# Patient Record
Sex: FEMALE | Race: WHITE | URBAN - NONMETROPOLITAN AREA
[De-identification: names, ages, dates, MRNs, and addresses within clinical notes are randomized per-mention and may not be internally consistent; named-entity substitution may affect disease eponyms.]

---

## 2017-01-26 ENCOUNTER — APPOINTMENT (RX ONLY)
Dept: URBAN - NONMETROPOLITAN AREA CLINIC 1 | Facility: CLINIC | Age: 57
Setting detail: DERMATOLOGY
End: 2017-01-26

## 2017-01-26 VITALS — HEIGHT: 64 IN | WEIGHT: 175 LBS

## 2017-01-26 DIAGNOSIS — D22 MELANOCYTIC NEVI: ICD-10-CM

## 2017-01-26 DIAGNOSIS — L40.0 PSORIASIS VULGARIS: ICD-10-CM | Status: WELL CONTROLLED

## 2017-01-26 PROBLEM — D22.4 MELANOCYTIC NEVI OF SCALP AND NECK: Status: ACTIVE | Noted: 2017-01-26

## 2017-01-26 PROCEDURE — 99214 OFFICE O/P EST MOD 30 MIN: CPT

## 2017-01-26 PROCEDURE — ? OBSERVATION

## 2017-01-26 PROCEDURE — ? COUNSELING

## 2017-01-26 ASSESSMENT — LOCATION DETAILED DESCRIPTION DERM
LOCATION DETAILED: RIGHT KNEE
LOCATION DETAILED: LEFT KNEE
LOCATION DETAILED: RIGHT SUPERIOR ANTERIOR NECK
LOCATION DETAILED: RIGHT ELBOW
LOCATION DETAILED: LEFT ELBOW

## 2017-01-26 ASSESSMENT — LOCATION SIMPLE DESCRIPTION DERM
LOCATION SIMPLE: RIGHT ELBOW
LOCATION SIMPLE: LEFT KNEE
LOCATION SIMPLE: LEFT ELBOW
LOCATION SIMPLE: RIGHT ANTERIOR NECK
LOCATION SIMPLE: RIGHT KNEE

## 2017-01-26 ASSESSMENT — LOCATION ZONE DERM
LOCATION ZONE: NECK
LOCATION ZONE: ARM
LOCATION ZONE: LEG

## 2017-01-26 NOTE — HPI: RASH (PSORIASIS)
How Severe Is Your Psoriasis?: mild
Is This A New Presentation, Or A Follow-Up?: Follow Up Psoriasis
Additional History: Psoriasis has improved on its own.  Needs minimal topical steroids.  No longer taking Otezla.

## 2017-01-27 ENCOUNTER — APPOINTMENT (RX ONLY)
Dept: URBAN - NONMETROPOLITAN AREA CLINIC 1 | Facility: CLINIC | Age: 57
Setting detail: DERMATOLOGY
End: 2017-01-27

## 2017-01-27 DIAGNOSIS — Z41.9 ENCOUNTER FOR PROCEDURE FOR PURPOSES OTHER THAN REMEDYING HEALTH STATE, UNSPECIFIED: ICD-10-CM

## 2017-01-27 PROCEDURE — ? BOTOX

## 2017-01-27 NOTE — PROCEDURE: BOTOX
Price (Use Numbers Only, No Special Characters Or $): 569
Periorbital Skin Units: 0
Additional Area 1 Location: Crows feet
Consent: Written consent obtained. Risks include but not limited to lid/brow ptosis, bruising, swelling, diplopia, temporary effect, incomplete chemical denervation.
Dilution (U/0.1 Cc): 3
Additional Area 5 Location: Upper lip
Expiration Date (Month Year): 8/2019
Glabellar Complex Units: 15
Additional Area 4 Location: Gummy smile
Additional Area 3 Location: Infraorbital
Lot #: [id number]
Detail Level: Zone
Additional Area 6 Location: arch of eyebrows
Additional Area 2 Location: Bunny lines
Post-Care Instructions: Patient instructed to not lie down for 4 hours and limit physical activity for 24 hours.

## 2018-02-14 ENCOUNTER — RX ONLY (OUTPATIENT)
Age: 58
Setting detail: RX ONLY
End: 2018-02-14

## 2018-02-14 RX ORDER — VALACYCLOVIR 1 G/1
TABLET ORAL
Qty: 30 | Refills: 0 | Status: CANCELLED
Stop reason: CLARIF

## 2018-06-14 ENCOUNTER — APPOINTMENT (RX ONLY)
Dept: URBAN - NONMETROPOLITAN AREA CLINIC 1 | Facility: CLINIC | Age: 58
Setting detail: DERMATOLOGY
End: 2018-06-14

## 2018-06-14 DIAGNOSIS — L82.1 OTHER SEBORRHEIC KERATOSIS: ICD-10-CM

## 2018-06-14 DIAGNOSIS — D22 MELANOCYTIC NEVI: ICD-10-CM

## 2018-06-14 DIAGNOSIS — L40.0 PSORIASIS VULGARIS: ICD-10-CM

## 2018-06-14 PROBLEM — D22.4 MELANOCYTIC NEVI OF SCALP AND NECK: Status: ACTIVE | Noted: 2018-06-14

## 2018-06-14 PROCEDURE — ? TREATMENT REGIMEN

## 2018-06-14 PROCEDURE — ? COUNSELING

## 2018-06-14 PROCEDURE — ? OBSERVATION

## 2018-06-14 PROCEDURE — 99214 OFFICE O/P EST MOD 30 MIN: CPT

## 2018-06-14 ASSESSMENT — LOCATION ZONE DERM
LOCATION ZONE: NECK
LOCATION ZONE: ARM
LOCATION ZONE: LEG

## 2018-06-14 ASSESSMENT — LOCATION DETAILED DESCRIPTION DERM
LOCATION DETAILED: RIGHT KNEE
LOCATION DETAILED: LEFT ELBOW
LOCATION DETAILED: LEFT KNEE
LOCATION DETAILED: RIGHT ELBOW
LOCATION DETAILED: RIGHT SUPERIOR ANTERIOR NECK
LOCATION DETAILED: RIGHT DISTAL PRETIBIAL REGION

## 2018-06-14 ASSESSMENT — LOCATION SIMPLE DESCRIPTION DERM
LOCATION SIMPLE: RIGHT PRETIBIAL REGION
LOCATION SIMPLE: RIGHT ANTERIOR NECK
LOCATION SIMPLE: LEFT ELBOW
LOCATION SIMPLE: RIGHT ELBOW
LOCATION SIMPLE: RIGHT KNEE
LOCATION SIMPLE: LEFT KNEE

## 2018-06-14 NOTE — PROCEDURE: OBSERVATION
Body Location Override (Optional - Billing Will Still Be Based On Selected Body Map Location If Applicable): Right Anterior Neck
Size Of Lesion In Cm (Optional): 0.6
Morphology Per Location (Optional): dark brown macule
Detail Level: Detailed
X Size Of Lesion In Cm (Optional): 0.3

## 2018-06-14 NOTE — HPI: RASH (PSORIASIS)
How Severe Is Your Psoriasis?: mild
Is This A New Presentation, Or A Follow-Up?: Follow Up Psoriasis
Additional History: Currently taking Otezla that she had left over from last year and topical steroids.

## 2018-10-26 ENCOUNTER — RX ONLY (OUTPATIENT)
Age: 58
Setting detail: RX ONLY
End: 2018-10-26

## 2018-10-26 RX ORDER — VALACYCLOVIR 1 G/1
TABLET ORAL
Qty: 30 | Refills: 3 | Status: ERX | COMMUNITY
Start: 2018-10-26

## 2018-11-09 ENCOUNTER — APPOINTMENT (RX ONLY)
Dept: URBAN - NONMETROPOLITAN AREA CLINIC 1 | Facility: CLINIC | Age: 58
Setting detail: DERMATOLOGY
End: 2018-11-09

## 2018-11-09 DIAGNOSIS — Z41.9 ENCOUNTER FOR PROCEDURE FOR PURPOSES OTHER THAN REMEDYING HEALTH STATE, UNSPECIFIED: ICD-10-CM

## 2018-11-09 PROCEDURE — ? BOTOX

## 2019-07-26 ENCOUNTER — APPOINTMENT (RX ONLY)
Dept: URBAN - NONMETROPOLITAN AREA CLINIC 1 | Facility: CLINIC | Age: 59
Setting detail: DERMATOLOGY
End: 2019-07-26

## 2019-08-06 ENCOUNTER — RX ONLY (OUTPATIENT)
Age: 59
Setting detail: RX ONLY
End: 2019-08-06

## 2019-08-06 ENCOUNTER — APPOINTMENT (RX ONLY)
Dept: URBAN - NONMETROPOLITAN AREA CLINIC 1 | Facility: CLINIC | Age: 59
Setting detail: DERMATOLOGY
End: 2019-08-06

## 2019-08-06 DIAGNOSIS — Z12.83 ENCOUNTER FOR SCREENING FOR MALIGNANT NEOPLASM OF SKIN: ICD-10-CM

## 2019-08-06 DIAGNOSIS — L40.0 PSORIASIS VULGARIS: ICD-10-CM

## 2019-08-06 PROCEDURE — 99213 OFFICE O/P EST LOW 20 MIN: CPT | Mod: 25

## 2019-08-06 PROCEDURE — ? INTRALESIONAL KENALOG

## 2019-08-06 PROCEDURE — ? PRESCRIPTION

## 2019-08-06 PROCEDURE — 11900 INJECT SKIN LESIONS </W 7: CPT

## 2019-08-06 PROCEDURE — ? COUNSELING

## 2019-08-06 RX ORDER — BETAMETHASONE DIPROPIONATE 0.5 MG/G
LOTION TOPICAL
Qty: 1 | Refills: 1 | Status: ERX

## 2019-08-06 RX ORDER — BETAMETHASONE DIPROPIONATE 0.5 MG/G
OINTMENT TOPICAL
Qty: 1 | Refills: 1 | Status: ERX

## 2019-08-06 RX ORDER — HALOBETASOL PROPIONATE AND TAZAROTENE .1; .45 MG/G; MG/G
LOTION TOPICAL
Qty: 1 | Refills: 3 | Status: ERX | COMMUNITY
Start: 2019-08-06

## 2019-08-06 RX ORDER — BETAMETHASONE DIPROPIONATE 0.5 MG/G
CREAM TOPICAL
Qty: 1 | Refills: 3 | Status: ERX

## 2019-08-06 RX ADMIN — HALOBETASOL PROPIONATE AND TAZAROTENE 1: .1; .45 LOTION TOPICAL at 00:00

## 2019-08-06 ASSESSMENT — LOCATION DETAILED DESCRIPTION DERM
LOCATION DETAILED: RIGHT SUPERIOR MEDIAL UPPER BACK
LOCATION DETAILED: RIGHT DISTAL PRETIBIAL REGION
LOCATION DETAILED: LEFT ELBOW
LOCATION DETAILED: RIGHT ELBOW
LOCATION DETAILED: LEFT PROXIMAL PRETIBIAL REGION

## 2019-08-06 ASSESSMENT — LOCATION SIMPLE DESCRIPTION DERM
LOCATION SIMPLE: LEFT ELBOW
LOCATION SIMPLE: RIGHT PRETIBIAL REGION
LOCATION SIMPLE: RIGHT ELBOW
LOCATION SIMPLE: LEFT PRETIBIAL REGION
LOCATION SIMPLE: RIGHT UPPER BACK

## 2019-08-06 ASSESSMENT — LOCATION ZONE DERM
LOCATION ZONE: TRUNK
LOCATION ZONE: LEG
LOCATION ZONE: ARM

## 2019-08-06 NOTE — PROCEDURE: INTRALESIONAL KENALOG
Kenalog Preparation: Kenalog
Expiration Date For Kenalog (Optional): 09/2020
Medical Necessity Clause: This procedure was medically necessary because the lesions that were treated were:
Consent: The risks of atrophy were reviewed with the patient.
X Size Of Lesion In Cm (Optional): 0
Detail Level: Detailed
Administered By (Optional): LINDSAY
Concentration Of Kenalog Solution Injected (Mg/Ml): 5.0
Include Z78.9 (Other Specified Conditions Influencing Health Status) As An Associated Diagnosis?: No
Total Volume (Ccs): 1
Lot # For Kenalog (Optional): [id number]

## 2019-08-28 ENCOUNTER — APPOINTMENT (RX ONLY)
Dept: URBAN - NONMETROPOLITAN AREA CLINIC 1 | Facility: CLINIC | Age: 59
Setting detail: DERMATOLOGY
End: 2019-08-28

## 2019-08-28 DIAGNOSIS — Z41.9 ENCOUNTER FOR PROCEDURE FOR PURPOSES OTHER THAN REMEDYING HEALTH STATE, UNSPECIFIED: ICD-10-CM

## 2019-08-28 PROCEDURE — ? BOTOX

## 2019-08-28 PROCEDURE — ? ADDITIONAL NOTES

## 2019-08-28 NOTE — PROCEDURE: ADDITIONAL NOTES
Detail Level: Simple
Additional Notes: I counseled the patient regarding the following:\\n\\nRN discussed the risks and benefits of botox including but not limited to bruising, muscle weakness, lid or brow ptosis, double vision, facial weakness, headaches, procedural pain, temporary benefit only.\\n\\nIt is impossible to know the exact location of the vasculature in the treatment area. If a vein or artery is compromised by the injection, pt may experience an increase in bruising as a result. This is temporary and usually resolves in a few days to a few weeks. \\n\\nPt understands botox is a neuro-modulator that is injected into the muscle, which relaxes the muscle movement. \\n\\nPatient understands that treatment with botox only lessens dynamic wrinkles on a temporary basis, usually for 2-3 months. \\n\\nPt is aware of the variability in patient response, including a lack of response to treatment, and that no guarantee of length of benefit can be made. \\n\\nPt taking blood thinners prior to treatment may experience an increased risk for bruising and bleeding at the injection site. \\n\\nPatient should not lie down for 4 hours after injections nor exercise for 24 hours. \\n\\nIf bruising presents, pt may utilize oral or topical arnica and ice for treatment. \\n\\nPt instructed to contact the clinic with any questions, concerns, or post treatment complications; ie: pt does not feel the product worked for them, etc.\\n\\nPatient understands that the treatment is cosmetic in nature and not covered by insurance.\\n\\Henok pt concerns and questions addressed and pt verbalized understanding.

## 2019-08-28 NOTE — PROCEDURE: BOTOX
Additional Area 4 Units: 0
Lot #: [id number]
Glabellar Complex Units: 20
Post-Care Instructions: Patient instructed to not lie down for 4 hours and limit physical activity for 24 hours. RN recommends topical arnica and/or ice if bruising presents.\\n\\nPt instructed to contact the clinic with any questions, concerns, or post treatment complications, ie: the pt feels the product did not work for them, etc.  \\n\\Henok pt concerns and questions addressed and pt verbalized understanding.
Price (Use Numbers Only, No Special Characters Or $): 240
Detail Level: Detailed
Dilution (U/0.1 Cc): 4
Consent: Written consent reviewed by RN and signed by pt. Risks include but not limited to lid/brow ptosis, bruising, swelling, diplopia, temporary effect, incomplete chemical denervation.  \\nPt denies pregnancy. \\nIf pt is breastfeeding, they are counseled to dispose of breast milk for 24 hours post botox injection. \\nPt denies current administration of anti-biotics. \\nPt denies allergy to albumin or lactose. \\nPt's taking blood thinners are counseled on the increased risk of bleeding and bruising at the injection site.

## 2020-07-14 ENCOUNTER — APPOINTMENT (RX ONLY)
Dept: URBAN - NONMETROPOLITAN AREA CLINIC 1 | Facility: CLINIC | Age: 60
Setting detail: DERMATOLOGY
End: 2020-07-14

## 2020-07-14 DIAGNOSIS — L82.0 INFLAMED SEBORRHEIC KERATOSIS: ICD-10-CM

## 2020-07-14 DIAGNOSIS — Z12.83 ENCOUNTER FOR SCREENING FOR MALIGNANT NEOPLASM OF SKIN: ICD-10-CM

## 2020-07-14 DIAGNOSIS — L40.0 PSORIASIS VULGARIS: ICD-10-CM

## 2020-07-14 PROCEDURE — ? LIQUID NITROGEN

## 2020-07-14 PROCEDURE — 99214 OFFICE O/P EST MOD 30 MIN: CPT | Mod: 25

## 2020-07-14 PROCEDURE — ? COUNSELING

## 2020-07-14 PROCEDURE — ? PRESCRIPTION MEDICATION MANAGEMENT

## 2020-07-14 PROCEDURE — 17110 DESTRUCTION B9 LES UP TO 14: CPT

## 2020-07-14 PROCEDURE — ? PRESCRIPTION

## 2020-07-14 ASSESSMENT — LOCATION DETAILED DESCRIPTION DERM
LOCATION DETAILED: LEFT DISTAL POSTERIOR THIGH
LOCATION DETAILED: SUPERIOR LUMBAR SPINE
LOCATION DETAILED: RIGHT SUPERIOR MEDIAL UPPER BACK

## 2020-07-14 ASSESSMENT — LOCATION ZONE DERM
LOCATION ZONE: LEG
LOCATION ZONE: TRUNK

## 2020-07-14 ASSESSMENT — BSA PSORIASIS: % BODY COVERED IN PSORIASIS: 40

## 2020-07-14 ASSESSMENT — LOCATION SIMPLE DESCRIPTION DERM
LOCATION SIMPLE: LOWER BACK
LOCATION SIMPLE: LEFT POSTERIOR THIGH
LOCATION SIMPLE: RIGHT UPPER BACK

## 2020-07-14 NOTE — PROCEDURE: LIQUID NITROGEN
Detail Level: Simple
Add 52 Modifier (Optional): no
Consent: The patient's consent was obtained including but not limited to risks of crusting, scabbing, blistering, scarring, darker or lighter pigmentary change, recurrence, incomplete removal and infection.
Post-Care Instructions: I reviewed with the patient in detail post-care instructions. Patient is to wear sunprotection, and avoid picking at any of the treated lesions. Pt may apply Vaseline to crusted or scabbing areas.
Medical Necessity Clause: This procedure was medically necessary because the lesions that were treated were:
Number Of Freeze-Thaw Cycles: 2 freeze-thaw cycles
Medical Necessity Information: It is in your best interest to select a reason for this procedure from the list below. All of these items fulfill various CMS LCD requirements except the new and changing color options.
Render Post-Care Instructions In Note?: yes

## 2020-07-14 NOTE — PROCEDURE: PRESCRIPTION MEDICATION MANAGEMENT
Render In Strict Bullet Format?: No
Initiate Treatment: Manuela
Detail Level: Zone
Continue Regimen: topical steroids

## 2020-07-15 RX ORDER — APREMILAST 30 MG/1
1 TABLET, FILM COATED ORAL BID
Qty: 60 | Refills: 11 | Status: ERX

## 2020-12-11 ENCOUNTER — APPOINTMENT (RX ONLY)
Dept: URBAN - NONMETROPOLITAN AREA CLINIC 1 | Facility: CLINIC | Age: 60
Setting detail: DERMATOLOGY
End: 2020-12-11

## 2020-12-11 VITALS — TEMPERATURE: 97 F

## 2020-12-11 DIAGNOSIS — Z41.9 ENCOUNTER FOR PROCEDURE FOR PURPOSES OTHER THAN REMEDYING HEALTH STATE, UNSPECIFIED: ICD-10-CM

## 2020-12-11 PROCEDURE — ? BOTOX

## 2020-12-11 PROCEDURE — ? ADDITIONAL NOTES

## 2020-12-11 NOTE — PROCEDURE: BOTOX
Additional Area 6 Units: 0
Post-Care Instructions: Patient instructed to not lie down for 4 hours and limit physical activity for 24 hours. RN recommends topical arnica and/or ice if bruising presents.\\n\\nPt instructed to contact the clinic with any questions, concerns, or post treatment complications, ie: the pt feels the product did not work for them, etc.  \\n\\Henok pt concerns and questions addressed and pt verbalized understanding.
Consent: Written consent reviewed by RN and signed by pt. Risks include but not limited to lid/brow ptosis, bruising, swelling, diplopia, temporary effect, incomplete chemical denervation.  \\nPt denies pregnancy. \\nIf pt is breastfeeding, they are counseled to dispose of breast milk for 24 hours post botox injection. \\nPt denies current administration of anti-biotics. \\nPt denies allergy to albumin or lactose. \\nPt's taking blood thinners are counseled on the increased risk of bleeding and bruising at the injection site.
Lot #: [id number]
Price (Use Numbers Only, No Special Characters Or $): 522
Depressor Anguli Oris Units: 8
Detail Level: Detailed
Dilution (U/0.1 Cc): 4
Glabellar Complex Units: 20

## 2020-12-11 NOTE — PROCEDURE: ADDITIONAL NOTES
Additional Notes: I counseled the patient regarding the following:\\n\\nRN discussed the risks and benefits of botox including but not limited to bruising, muscle weakness, lid or brow ptosis, double vision, facial weakness, headaches, procedural pain, temporary benefit only.\\n\\nIt is impossible to know the exact location of the vasculature in the treatment area. If a vein or artery is compromised by the injection, pt may experience an increase in bruising as a result. This is temporary and usually resolves in a few days to a few weeks. \\n\\nPt understands botox is a neuro-modulator that is injected into the muscle, which relaxes the muscle movement. \\n\\nPatient understands that treatment with botox only lessens dynamic wrinkles on a temporary basis, usually for 2-3 months. \\n\\nPt is aware of the variability in patient response, including a lack of response to treatment, and that no guarantee of length of benefit can be made. \\n\\nPt taking blood thinners prior to treatment may experience an increased risk for bruising and bleeding at the injection site. \\n\\nPatient should not lie down for 4 hours after injections nor exercise for 24 hours. \\n\\nIf bruising presents, pt may utilize oral or topical arnica and ice for treatment. \\n\\nPt instructed to contact the clinic with any questions, concerns, or post treatment complications; ie: pt does not feel the product worked for them, etc.\\n\\nPatient understands that the treatment is cosmetic in nature and not covered by insurance.\\n\\Henok pt concerns and questions addressed and pt verbalized understanding.
Detail Level: Simple

## 2021-10-14 ENCOUNTER — APPOINTMENT (RX ONLY)
Dept: URBAN - NONMETROPOLITAN AREA CLINIC 1 | Facility: CLINIC | Age: 61
Setting detail: DERMATOLOGY
End: 2021-10-14

## 2021-10-14 DIAGNOSIS — Z41.9 ENCOUNTER FOR PROCEDURE FOR PURPOSES OTHER THAN REMEDYING HEALTH STATE, UNSPECIFIED: ICD-10-CM

## 2021-10-14 PROCEDURE — ? IRIDEX LASER

## 2021-10-14 PROCEDURE — ? BOTOX

## 2021-10-14 PROCEDURE — ? ADDITIONAL NOTES

## 2021-10-14 ASSESSMENT — LOCATION ZONE DERM: LOCATION ZONE: FACE

## 2021-10-14 ASSESSMENT — LOCATION DETAILED DESCRIPTION DERM: LOCATION DETAILED: INFERIOR MID FOREHEAD

## 2021-10-14 ASSESSMENT — LOCATION SIMPLE DESCRIPTION DERM: LOCATION SIMPLE: INFERIOR FOREHEAD

## 2021-10-14 NOTE — PROCEDURE: IRIDEX LASER
Pre Procedure Text: Pt swished with antiseptic mouthwash for 30 seconds prior to treatment. \\n\\nPhotos taken. The treatment areas were numbed for at least 15 mins, topical numbing removed with a warm towel.\\n\\nAppropriate eye protection placed on pt and pt instructed to keep their eyes closed during the treatment. \\n\\n532 wavelength \\nE=15\\nPD=28\\nRR=3
Price (Use Numbers Only, No Special Characters Or $): 100
Post Procedure Text: Alastin nectar and sunscreen applied. Post care reviewed and pt verbalized understanding.\\n\\nPt is aware that several treatments may be required to achieve the desired result.
Angeles Override (Optional): 532 wavelength
Smyth: 3 smyth
Handpiece: 1000 micron
External Cooling Fan Speed: 5
Pulse Count (Optional): 9
Post-Care Instructions: RN reviewed with the patient in detail post-care instructions. Patient is to apply vaseline with a q-tip to all crusted areas, and avoid picking at any scabs. Pt should stay away from the sun and wear sun protection until fully healed. Pt verbalized understanding.
Repetition Rate: 3 ms
Joules: 15
Consent: Written consent reviewed by RN and signed by pt.  Risks reviewed including but not limited to crusting, scabbing, blistering, scarring, darker or lighter pigmentary change, and/or incomplete removal.
Treatment Number: 1
Detail Level: Zone

## 2021-10-14 NOTE — PROCEDURE: BOTOX
Additional Area 6 Units: 0
Post-Care Instructions: Patient instructed to not lie down for 4 hours and limit physical activity for 24 hours. RN recommends topical arnica and/or ice if bruising presents.\\n\\nPt instructed to contact the clinic with any questions, concerns, or post treatment complications, ie: the pt feels the product did not work for them, etc.  \\n\\Henok pt concerns and questions addressed and pt verbalized understanding.
Consent: Written consent reviewed by RN and signed by pt. Risks include but not limited to lid/brow ptosis, bruising, swelling, diplopia, temporary effect, incomplete chemical denervation.  \\nPt denies pregnancy. \\nIf pt is breastfeeding, they are counseled to dispose of breast milk for 24 hours post botox injection. \\nPt denies current administration of anti-biotics. \\nPt denies allergy to albumin or lactose. \\nPt's taking blood thinners are counseled on the increased risk of bleeding and bruising at the injection site.
Lot #: [id number]
Price (Use Numbers Only, No Special Characters Or $): 882
Depressor Anguli Oris Units: 8
Detail Level: Detailed
Dilution (U/0.1 Cc): 4
Glabellar Complex Units: 20

## 2021-10-21 ENCOUNTER — HOSPITAL ENCOUNTER (INPATIENT)
Facility: MEDICAL CENTER | Age: 61
LOS: 2 days | DRG: 247 | End: 2021-10-23
Attending: EMERGENCY MEDICINE | Admitting: STUDENT IN AN ORGANIZED HEALTH CARE EDUCATION/TRAINING PROGRAM
Payer: COMMERCIAL

## 2021-10-21 DIAGNOSIS — I21.4 NSTEMI (NON-ST ELEVATED MYOCARDIAL INFARCTION) (HCC): ICD-10-CM

## 2021-10-21 DIAGNOSIS — R07.9 CHEST PAIN, UNSPECIFIED TYPE: ICD-10-CM

## 2021-10-21 LAB
ALBUMIN SERPL BCP-MCNC: 4.2 G/DL (ref 3.2–4.9)
ALBUMIN/GLOB SERPL: 1.5 G/DL
ALP SERPL-CCNC: 57 U/L (ref 30–99)
ALT SERPL-CCNC: 28 U/L (ref 2–50)
ANION GAP SERPL CALC-SCNC: 11 MMOL/L (ref 7–16)
APTT PPP: 73.9 SEC (ref 24.7–36)
AST SERPL-CCNC: 69 U/L (ref 12–45)
BASOPHILS # BLD AUTO: 0.3 % (ref 0–1.8)
BASOPHILS # BLD: 0.02 K/UL (ref 0–0.12)
BILIRUB SERPL-MCNC: 0.8 MG/DL (ref 0.1–1.5)
BUN SERPL-MCNC: 10 MG/DL (ref 8–22)
CALCIUM SERPL-MCNC: 9.3 MG/DL (ref 8.5–10.5)
CHLORIDE SERPL-SCNC: 107 MMOL/L (ref 96–112)
CHOLEST SERPL-MCNC: 235 MG/DL (ref 100–199)
CO2 SERPL-SCNC: 21 MMOL/L (ref 20–33)
CREAT SERPL-MCNC: 0.7 MG/DL (ref 0.5–1.4)
EOSINOPHIL # BLD AUTO: 0.13 K/UL (ref 0–0.51)
EOSINOPHIL NFR BLD: 1.8 % (ref 0–6.9)
ERYTHROCYTE [DISTWIDTH] IN BLOOD BY AUTOMATED COUNT: 41 FL (ref 35.9–50)
GLOBULIN SER CALC-MCNC: 2.8 G/DL (ref 1.9–3.5)
GLUCOSE SERPL-MCNC: 107 MG/DL (ref 65–99)
HCT VFR BLD AUTO: 41.8 % (ref 37–47)
HDLC SERPL-MCNC: 45 MG/DL
HGB BLD-MCNC: 14.3 G/DL (ref 12–16)
IMM GRANULOCYTES # BLD AUTO: 0.02 K/UL (ref 0–0.11)
IMM GRANULOCYTES NFR BLD AUTO: 0.3 % (ref 0–0.9)
INR PPP: 1.04 (ref 0.87–1.13)
LDLC SERPL CALC-MCNC: 155 MG/DL
LYMPHOCYTES # BLD AUTO: 1.98 K/UL (ref 1–4.8)
LYMPHOCYTES NFR BLD: 27.4 % (ref 22–41)
MAGNESIUM SERPL-MCNC: 2.3 MG/DL (ref 1.5–2.5)
MCH RBC QN AUTO: 30.8 PG (ref 27–33)
MCHC RBC AUTO-ENTMCNC: 34.2 G/DL (ref 33.6–35)
MCV RBC AUTO: 89.9 FL (ref 81.4–97.8)
MONOCYTES # BLD AUTO: 0.53 K/UL (ref 0–0.85)
MONOCYTES NFR BLD AUTO: 7.3 % (ref 0–13.4)
NEUTROPHILS # BLD AUTO: 4.55 K/UL (ref 2–7.15)
NEUTROPHILS NFR BLD: 62.9 % (ref 44–72)
NRBC # BLD AUTO: 0 K/UL
NRBC BLD-RTO: 0 /100 WBC
PLATELET # BLD AUTO: 214 K/UL (ref 164–446)
PMV BLD AUTO: 10.6 FL (ref 9–12.9)
POTASSIUM SERPL-SCNC: 3.9 MMOL/L (ref 3.6–5.5)
PROT SERPL-MCNC: 7 G/DL (ref 6–8.2)
PROTHROMBIN TIME: 13.3 SEC (ref 12–14.6)
RBC # BLD AUTO: 4.65 M/UL (ref 4.2–5.4)
SODIUM SERPL-SCNC: 139 MMOL/L (ref 135–145)
TRIGL SERPL-MCNC: 177 MG/DL (ref 0–149)
TSH SERPL DL<=0.005 MIU/L-ACNC: 1.28 UIU/ML (ref 0.38–5.33)
UFH PPP CHRO-ACNC: 0.33 IU/ML
WBC # BLD AUTO: 7.2 K/UL (ref 4.8–10.8)

## 2021-10-21 PROCEDURE — 85730 THROMBOPLASTIN TIME PARTIAL: CPT

## 2021-10-21 PROCEDURE — 85025 COMPLETE CBC W/AUTO DIFF WBC: CPT

## 2021-10-21 PROCEDURE — 84443 ASSAY THYROID STIM HORMONE: CPT

## 2021-10-21 PROCEDURE — 85610 PROTHROMBIN TIME: CPT

## 2021-10-21 PROCEDURE — 99285 EMERGENCY DEPT VISIT HI MDM: CPT

## 2021-10-21 PROCEDURE — 770020 HCHG ROOM/CARE - TELE (206)

## 2021-10-21 PROCEDURE — A9270 NON-COVERED ITEM OR SERVICE: HCPCS | Performed by: INTERNAL MEDICINE

## 2021-10-21 PROCEDURE — 85520 HEPARIN ASSAY: CPT

## 2021-10-21 PROCEDURE — 80307 DRUG TEST PRSMV CHEM ANLYZR: CPT

## 2021-10-21 PROCEDURE — 83735 ASSAY OF MAGNESIUM: CPT

## 2021-10-21 PROCEDURE — 99223 1ST HOSP IP/OBS HIGH 75: CPT | Performed by: STUDENT IN AN ORGANIZED HEALTH CARE EDUCATION/TRAINING PROGRAM

## 2021-10-21 PROCEDURE — 96366 THER/PROPH/DIAG IV INF ADDON: CPT

## 2021-10-21 PROCEDURE — 93005 ELECTROCARDIOGRAM TRACING: CPT | Performed by: EMERGENCY MEDICINE

## 2021-10-21 PROCEDURE — 700111 HCHG RX REV CODE 636 W/ 250 OVERRIDE (IP): Performed by: EMERGENCY MEDICINE

## 2021-10-21 PROCEDURE — 99222 1ST HOSP IP/OBS MODERATE 55: CPT | Performed by: INTERNAL MEDICINE

## 2021-10-21 PROCEDURE — 80053 COMPREHEN METABOLIC PANEL: CPT

## 2021-10-21 PROCEDURE — 700105 HCHG RX REV CODE 258: Performed by: INTERNAL MEDICINE

## 2021-10-21 PROCEDURE — 96365 THER/PROPH/DIAG IV INF INIT: CPT

## 2021-10-21 PROCEDURE — 700102 HCHG RX REV CODE 250 W/ 637 OVERRIDE(OP): Performed by: INTERNAL MEDICINE

## 2021-10-21 PROCEDURE — 80061 LIPID PANEL: CPT

## 2021-10-21 RX ORDER — BISACODYL 10 MG
10 SUPPOSITORY, RECTAL RECTAL
Status: DISCONTINUED | OUTPATIENT
Start: 2021-10-21 | End: 2021-10-22

## 2021-10-21 RX ORDER — MORPHINE SULFATE 4 MG/ML
2 INJECTION, SOLUTION INTRAMUSCULAR; INTRAVENOUS
Status: DISCONTINUED | OUTPATIENT
Start: 2021-10-21 | End: 2021-10-23 | Stop reason: HOSPADM

## 2021-10-21 RX ORDER — ONDANSETRON 2 MG/ML
4 INJECTION INTRAMUSCULAR; INTRAVENOUS EVERY 4 HOURS PRN
Status: DISCONTINUED | OUTPATIENT
Start: 2021-10-21 | End: 2021-10-21

## 2021-10-21 RX ORDER — ASPIRIN 81 MG/1
81 TABLET, CHEWABLE ORAL DAILY
Status: DISCONTINUED | OUTPATIENT
Start: 2021-10-22 | End: 2021-10-21

## 2021-10-21 RX ORDER — PROMETHAZINE HYDROCHLORIDE 25 MG/1
12.5-25 TABLET ORAL EVERY 4 HOURS PRN
Status: DISCONTINUED | OUTPATIENT
Start: 2021-10-21 | End: 2021-10-21

## 2021-10-21 RX ORDER — ONDANSETRON 4 MG/1
4 TABLET, ORALLY DISINTEGRATING ORAL EVERY 4 HOURS PRN
Status: DISCONTINUED | OUTPATIENT
Start: 2021-10-21 | End: 2021-10-23 | Stop reason: HOSPADM

## 2021-10-21 RX ORDER — PROCHLORPERAZINE EDISYLATE 5 MG/ML
5-10 INJECTION INTRAMUSCULAR; INTRAVENOUS EVERY 4 HOURS PRN
Status: DISCONTINUED | OUTPATIENT
Start: 2021-10-21 | End: 2021-10-23 | Stop reason: HOSPADM

## 2021-10-21 RX ORDER — HEPARIN SODIUM 5000 [USP'U]/100ML
0-30 INJECTION, SOLUTION INTRAVENOUS CONTINUOUS
Status: DISCONTINUED | OUTPATIENT
Start: 2021-10-21 | End: 2021-10-22

## 2021-10-21 RX ORDER — HEPARIN SODIUM 1000 [USP'U]/ML
30 INJECTION, SOLUTION INTRAVENOUS; SUBCUTANEOUS PRN
Status: DISCONTINUED | OUTPATIENT
Start: 2021-10-21 | End: 2021-10-22

## 2021-10-21 RX ORDER — GUAIFENESIN/DEXTROMETHORPHAN 100-10MG/5
10 SYRUP ORAL EVERY 6 HOURS PRN
Status: DISCONTINUED | OUTPATIENT
Start: 2021-10-21 | End: 2021-10-23 | Stop reason: HOSPADM

## 2021-10-21 RX ORDER — CLONIDINE HYDROCHLORIDE 0.1 MG/1
0.1 TABLET ORAL EVERY 6 HOURS PRN
Status: DISCONTINUED | OUTPATIENT
Start: 2021-10-21 | End: 2021-10-23 | Stop reason: HOSPADM

## 2021-10-21 RX ORDER — ENALAPRILAT 1.25 MG/ML
1.25 INJECTION INTRAVENOUS EVERY 6 HOURS PRN
Status: DISCONTINUED | OUTPATIENT
Start: 2021-10-21 | End: 2021-10-23 | Stop reason: HOSPADM

## 2021-10-21 RX ORDER — LABETALOL HYDROCHLORIDE 5 MG/ML
10 INJECTION, SOLUTION INTRAVENOUS EVERY 4 HOURS PRN
Status: DISCONTINUED | OUTPATIENT
Start: 2021-10-21 | End: 2021-10-23 | Stop reason: HOSPADM

## 2021-10-21 RX ORDER — ALUMINA, MAGNESIA, AND SIMETHICONE 2400; 2400; 240 MG/30ML; MG/30ML; MG/30ML
30 SUSPENSION ORAL EVERY 4 HOURS PRN
Status: DISCONTINUED | OUTPATIENT
Start: 2021-10-21 | End: 2021-10-23 | Stop reason: HOSPADM

## 2021-10-21 RX ORDER — LORAZEPAM 1 MG/1
1 TABLET ORAL ONCE
Status: COMPLETED | OUTPATIENT
Start: 2021-10-21 | End: 2021-10-21

## 2021-10-21 RX ORDER — ACETAMINOPHEN 325 MG/1
650 TABLET ORAL EVERY 4 HOURS PRN
Status: DISCONTINUED | OUTPATIENT
Start: 2021-10-21 | End: 2021-10-23 | Stop reason: HOSPADM

## 2021-10-21 RX ORDER — POLYETHYLENE GLYCOL 3350 17 G/17G
1 POWDER, FOR SOLUTION ORAL
Status: DISCONTINUED | OUTPATIENT
Start: 2021-10-21 | End: 2021-10-22

## 2021-10-21 RX ORDER — SODIUM CHLORIDE 9 MG/ML
INJECTION, SOLUTION INTRAVENOUS CONTINUOUS
Status: DISCONTINUED | OUTPATIENT
Start: 2021-10-21 | End: 2021-10-23 | Stop reason: HOSPADM

## 2021-10-21 RX ORDER — ATORVASTATIN CALCIUM 20 MG/1
20 TABLET, FILM COATED ORAL
Status: DISCONTINUED | OUTPATIENT
Start: 2021-10-21 | End: 2021-10-21

## 2021-10-21 RX ORDER — HEPARIN SODIUM 10000 [USP'U]/100ML
12 INJECTION, SOLUTION INTRAVENOUS CONTINUOUS
Status: DISCONTINUED | OUTPATIENT
Start: 2021-10-21 | End: 2021-10-21

## 2021-10-21 RX ORDER — ACETAMINOPHEN 325 MG/1
650 TABLET ORAL EVERY 6 HOURS PRN
Status: DISCONTINUED | OUTPATIENT
Start: 2021-10-21 | End: 2021-10-21

## 2021-10-21 RX ORDER — LOSARTAN POTASSIUM 25 MG/1
25 TABLET ORAL DAILY
COMMUNITY
End: 2022-07-07

## 2021-10-21 RX ORDER — ATORVASTATIN CALCIUM 80 MG/1
80 TABLET, FILM COATED ORAL DAILY
Status: DISCONTINUED | OUTPATIENT
Start: 2021-10-22 | End: 2021-10-23 | Stop reason: HOSPADM

## 2021-10-21 RX ORDER — PROMETHAZINE HYDROCHLORIDE 25 MG/1
12.5-25 SUPPOSITORY RECTAL EVERY 4 HOURS PRN
Status: DISCONTINUED | OUTPATIENT
Start: 2021-10-21 | End: 2021-10-23 | Stop reason: HOSPADM

## 2021-10-21 RX ORDER — AMOXICILLIN 250 MG
2 CAPSULE ORAL 2 TIMES DAILY
Status: DISCONTINUED | OUTPATIENT
Start: 2021-10-21 | End: 2021-10-22

## 2021-10-21 RX ORDER — NITROGLYCERIN 0.4 MG/1
0.4 TABLET SUBLINGUAL
Status: DISCONTINUED | OUTPATIENT
Start: 2021-10-21 | End: 2021-10-23 | Stop reason: HOSPADM

## 2021-10-21 RX ADMIN — HEPARIN SODIUM 12 UNITS/KG/HR: 5000 INJECTION, SOLUTION INTRAVENOUS at 21:42

## 2021-10-21 RX ADMIN — LORAZEPAM 1 MG: 1 TABLET ORAL at 22:15

## 2021-10-21 RX ADMIN — METOPROLOL TARTRATE 25 MG: 25 TABLET, FILM COATED ORAL at 22:45

## 2021-10-21 RX ADMIN — SODIUM CHLORIDE: 9 INJECTION, SOLUTION INTRAVENOUS at 22:30

## 2021-10-21 ASSESSMENT — ENCOUNTER SYMPTOMS
SHORTNESS OF BREATH: 0
ABDOMINAL PAIN: 0
DIZZINESS: 0
PALPITATIONS: 0
NAUSEA: 0
HEADACHES: 0
CHEST TIGHTNESS: 0

## 2021-10-22 ENCOUNTER — APPOINTMENT (OUTPATIENT)
Dept: CARDIOLOGY | Facility: MEDICAL CENTER | Age: 61
DRG: 247 | End: 2021-10-22
Attending: INTERNAL MEDICINE
Payer: COMMERCIAL

## 2021-10-22 PROBLEM — E78.5 HLD (HYPERLIPIDEMIA): Status: ACTIVE | Noted: 2021-10-22

## 2021-10-22 PROBLEM — I21.4 NSTEMI (NON-ST ELEVATED MYOCARDIAL INFARCTION) (HCC): Status: ACTIVE | Noted: 2021-10-22

## 2021-10-22 PROBLEM — I10 HTN (HYPERTENSION): Status: ACTIVE | Noted: 2021-10-22

## 2021-10-22 LAB
ACT BLD: 208 SEC (ref 74–137)
AMPHET UR QL SCN: NEGATIVE
BARBITURATES UR QL SCN: NEGATIVE
BENZODIAZ UR QL SCN: NEGATIVE
BZE UR QL SCN: NEGATIVE
CANNABINOIDS UR QL SCN: NEGATIVE
EKG IMPRESSION: NORMAL
EKG IMPRESSION: NORMAL
EST. AVERAGE GLUCOSE BLD GHB EST-MCNC: 97 MG/DL
HBA1C MFR BLD: 5 % (ref 4–5.6)
METHADONE UR QL SCN: NEGATIVE
OPIATES UR QL SCN: NEGATIVE
OXYCODONE UR QL SCN: NEGATIVE
PCP UR QL SCN: NEGATIVE
PROPOXYPH UR QL SCN: NEGATIVE
TROPONIN T SERPL-MCNC: 691 NG/L (ref 6–19)
TROPONIN T SERPL-MCNC: 839 NG/L (ref 6–19)
UFH PPP CHRO-ACNC: 0.13 IU/ML
UFH PPP CHRO-ACNC: 0.25 IU/ML

## 2021-10-22 PROCEDURE — 700111 HCHG RX REV CODE 636 W/ 250 OVERRIDE (IP): Performed by: INTERNAL MEDICINE

## 2021-10-22 PROCEDURE — 700102 HCHG RX REV CODE 250 W/ 637 OVERRIDE(OP)

## 2021-10-22 PROCEDURE — 700111 HCHG RX REV CODE 636 W/ 250 OVERRIDE (IP)

## 2021-10-22 PROCEDURE — 700102 HCHG RX REV CODE 250 W/ 637 OVERRIDE(OP): Performed by: NURSE PRACTITIONER

## 2021-10-22 PROCEDURE — 770020 HCHG ROOM/CARE - TELE (206)

## 2021-10-22 PROCEDURE — 700102 HCHG RX REV CODE 250 W/ 637 OVERRIDE(OP): Performed by: STUDENT IN AN ORGANIZED HEALTH CARE EDUCATION/TRAINING PROGRAM

## 2021-10-22 PROCEDURE — 99233 SBSQ HOSP IP/OBS HIGH 50: CPT | Performed by: GENERAL PRACTICE

## 2021-10-22 PROCEDURE — 85520 HEPARIN ASSAY: CPT

## 2021-10-22 PROCEDURE — 92928 PRQ TCAT PLMT NTRAC ST 1 LES: CPT | Mod: RC | Performed by: INTERNAL MEDICINE

## 2021-10-22 PROCEDURE — 700102 HCHG RX REV CODE 250 W/ 637 OVERRIDE(OP): Performed by: INTERNAL MEDICINE

## 2021-10-22 PROCEDURE — 96366 THER/PROPH/DIAG IV INF ADDON: CPT

## 2021-10-22 PROCEDURE — A9270 NON-COVERED ITEM OR SERVICE: HCPCS | Performed by: STUDENT IN AN ORGANIZED HEALTH CARE EDUCATION/TRAINING PROGRAM

## 2021-10-22 PROCEDURE — 4A023N7 MEASUREMENT OF CARDIAC SAMPLING AND PRESSURE, LEFT HEART, PERCUTANEOUS APPROACH: ICD-10-PCS | Performed by: INTERNAL MEDICINE

## 2021-10-22 PROCEDURE — 83036 HEMOGLOBIN GLYCOSYLATED A1C: CPT

## 2021-10-22 PROCEDURE — 93005 ELECTROCARDIOGRAM TRACING: CPT | Performed by: INTERNAL MEDICINE

## 2021-10-22 PROCEDURE — 99153 MOD SED SAME PHYS/QHP EA: CPT

## 2021-10-22 PROCEDURE — A9270 NON-COVERED ITEM OR SERVICE: HCPCS

## 2021-10-22 PROCEDURE — 84484 ASSAY OF TROPONIN QUANT: CPT

## 2021-10-22 PROCEDURE — A9270 NON-COVERED ITEM OR SERVICE: HCPCS | Performed by: GENERAL PRACTICE

## 2021-10-22 PROCEDURE — 85347 COAGULATION TIME ACTIVATED: CPT | Mod: 91

## 2021-10-22 PROCEDURE — 99152 MOD SED SAME PHYS/QHP 5/>YRS: CPT | Performed by: INTERNAL MEDICINE

## 2021-10-22 PROCEDURE — 700105 HCHG RX REV CODE 258: Performed by: INTERNAL MEDICINE

## 2021-10-22 PROCEDURE — 700102 HCHG RX REV CODE 250 W/ 637 OVERRIDE(OP): Performed by: GENERAL PRACTICE

## 2021-10-22 PROCEDURE — A9270 NON-COVERED ITEM OR SERVICE: HCPCS | Performed by: INTERNAL MEDICINE

## 2021-10-22 PROCEDURE — 93458 L HRT ARTERY/VENTRICLE ANGIO: CPT | Mod: 26,59 | Performed by: INTERNAL MEDICINE

## 2021-10-22 PROCEDURE — 96375 TX/PRO/DX INJ NEW DRUG ADDON: CPT

## 2021-10-22 PROCEDURE — B2111ZZ FLUOROSCOPY OF MULTIPLE CORONARY ARTERIES USING LOW OSMOLAR CONTRAST: ICD-10-PCS | Performed by: INTERNAL MEDICINE

## 2021-10-22 PROCEDURE — 700101 HCHG RX REV CODE 250

## 2021-10-22 PROCEDURE — 027034Z DILATION OF CORONARY ARTERY, ONE ARTERY WITH DRUG-ELUTING INTRALUMINAL DEVICE, PERCUTANEOUS APPROACH: ICD-10-PCS | Performed by: INTERNAL MEDICINE

## 2021-10-22 PROCEDURE — 36415 COLL VENOUS BLD VENIPUNCTURE: CPT

## 2021-10-22 PROCEDURE — 700117 HCHG RX CONTRAST REV CODE 255: Performed by: INTERNAL MEDICINE

## 2021-10-22 PROCEDURE — 93010 ELECTROCARDIOGRAM REPORT: CPT | Performed by: INTERNAL MEDICINE

## 2021-10-22 PROCEDURE — 99232 SBSQ HOSP IP/OBS MODERATE 35: CPT | Mod: 25 | Performed by: INTERNAL MEDICINE

## 2021-10-22 PROCEDURE — B2151ZZ FLUOROSCOPY OF LEFT HEART USING LOW OSMOLAR CONTRAST: ICD-10-PCS | Performed by: INTERNAL MEDICINE

## 2021-10-22 PROCEDURE — A9270 NON-COVERED ITEM OR SERVICE: HCPCS | Performed by: NURSE PRACTITIONER

## 2021-10-22 RX ORDER — PRASUGREL 10 MG/1
TABLET, FILM COATED ORAL
Status: DISPENSED
Start: 2021-10-22 | End: 2021-10-22

## 2021-10-22 RX ORDER — HEPARIN SODIUM 200 [USP'U]/100ML
INJECTION, SOLUTION INTRAVENOUS
Status: COMPLETED
Start: 2021-10-22 | End: 2021-10-22

## 2021-10-22 RX ORDER — LIDOCAINE HYDROCHLORIDE 20 MG/ML
INJECTION, SOLUTION INFILTRATION; PERINEURAL
Status: COMPLETED
Start: 2021-10-22 | End: 2021-10-22

## 2021-10-22 RX ORDER — MIDAZOLAM HYDROCHLORIDE 1 MG/ML
INJECTION INTRAMUSCULAR; INTRAVENOUS
Status: COMPLETED
Start: 2021-10-22 | End: 2021-10-22

## 2021-10-22 RX ORDER — PRASUGREL 10 MG/1
TABLET, FILM COATED ORAL
Status: COMPLETED
Start: 2021-10-22 | End: 2021-10-22

## 2021-10-22 RX ORDER — PRASUGREL 10 MG/1
10 TABLET, FILM COATED ORAL DAILY
Status: DISCONTINUED | OUTPATIENT
Start: 2021-10-23 | End: 2021-10-23 | Stop reason: HOSPADM

## 2021-10-22 RX ORDER — SODIUM CHLORIDE 9 MG/ML
INJECTION, SOLUTION INTRAVENOUS CONTINUOUS
Status: ACTIVE | OUTPATIENT
Start: 2021-10-22 | End: 2021-10-22

## 2021-10-22 RX ORDER — HEPARIN SODIUM 1000 [USP'U]/ML
INJECTION, SOLUTION INTRAVENOUS; SUBCUTANEOUS
Status: COMPLETED
Start: 2021-10-22 | End: 2021-10-22

## 2021-10-22 RX ORDER — BISACODYL 10 MG
10 SUPPOSITORY, RECTAL RECTAL
Status: DISCONTINUED | OUTPATIENT
Start: 2021-10-22 | End: 2021-10-23 | Stop reason: HOSPADM

## 2021-10-22 RX ORDER — PRASUGREL 10 MG/1
60 TABLET, FILM COATED ORAL ONCE
Status: DISCONTINUED | OUTPATIENT
Start: 2021-10-22 | End: 2021-10-22

## 2021-10-22 RX ORDER — AMOXICILLIN 250 MG
2 CAPSULE ORAL 2 TIMES DAILY PRN
Status: DISCONTINUED | OUTPATIENT
Start: 2021-10-22 | End: 2021-10-23 | Stop reason: HOSPADM

## 2021-10-22 RX ORDER — POLYETHYLENE GLYCOL 3350 17 G/17G
1 POWDER, FOR SOLUTION ORAL
Status: DISCONTINUED | OUTPATIENT
Start: 2021-10-22 | End: 2021-10-23 | Stop reason: HOSPADM

## 2021-10-22 RX ORDER — VERAPAMIL HYDROCHLORIDE 2.5 MG/ML
INJECTION, SOLUTION INTRAVENOUS
Status: COMPLETED
Start: 2021-10-22 | End: 2021-10-22

## 2021-10-22 RX ORDER — METOPROLOL SUCCINATE 25 MG/1
25 TABLET, EXTENDED RELEASE ORAL
Status: DISCONTINUED | OUTPATIENT
Start: 2021-10-22 | End: 2021-10-23 | Stop reason: HOSPADM

## 2021-10-22 RX ADMIN — HEPARIN SODIUM 1900 UNITS: 1000 INJECTION, SOLUTION INTRAVENOUS; SUBCUTANEOUS at 08:28

## 2021-10-22 RX ADMIN — ACETAMINOPHEN 650 MG: 325 TABLET ORAL at 23:01

## 2021-10-22 RX ADMIN — MIDAZOLAM HYDROCHLORIDE 1 MG: 1 INJECTION, SOLUTION INTRAMUSCULAR; INTRAVENOUS at 12:03

## 2021-10-22 RX ADMIN — HEPARIN SODIUM 2000 UNITS: 1000 INJECTION, SOLUTION INTRAVENOUS; SUBCUTANEOUS at 12:07

## 2021-10-22 RX ADMIN — PRASUGREL 60 MG: 10 TABLET, FILM COATED ORAL at 12:02

## 2021-10-22 RX ADMIN — HEPARIN SODIUM 2000 UNITS: 1000 INJECTION, SOLUTION INTRAVENOUS; SUBCUTANEOUS at 11:23

## 2021-10-22 RX ADMIN — ATORVASTATIN CALCIUM 80 MG: 80 TABLET, FILM COATED ORAL at 05:35

## 2021-10-22 RX ADMIN — FENTANYL CITRATE 100 MCG: 50 INJECTION INTRAMUSCULAR; INTRAVENOUS at 11:24

## 2021-10-22 RX ADMIN — SODIUM CHLORIDE: 9 INJECTION, SOLUTION INTRAVENOUS at 13:00

## 2021-10-22 RX ADMIN — ASPIRIN 81 MG: 81 TABLET, COATED ORAL at 09:32

## 2021-10-22 RX ADMIN — MIDAZOLAM HYDROCHLORIDE 2 MG: 1 INJECTION, SOLUTION INTRAMUSCULAR; INTRAVENOUS at 11:31

## 2021-10-22 RX ADMIN — NITROGLYCERIN 10 ML: 20 INJECTION INTRAVENOUS at 11:23

## 2021-10-22 RX ADMIN — METOPROLOL SUCCINATE 25 MG: 25 TABLET, EXTENDED RELEASE ORAL at 14:03

## 2021-10-22 RX ADMIN — VERAPAMIL HYDROCHLORIDE 2.5 MG: 2.5 INJECTION, SOLUTION INTRAVENOUS at 11:23

## 2021-10-22 RX ADMIN — HEPARIN SODIUM: 1000 INJECTION, SOLUTION INTRAVENOUS; SUBCUTANEOUS at 11:24

## 2021-10-22 RX ADMIN — LIDOCAINE HYDROCHLORIDE: 20 INJECTION, SOLUTION INFILTRATION; PERINEURAL at 11:23

## 2021-10-22 RX ADMIN — IOHEXOL 63 ML: 350 INJECTION, SOLUTION INTRAVENOUS at 12:07

## 2021-10-22 ASSESSMENT — ENCOUNTER SYMPTOMS
TROUBLE SWALLOWING: 0
CHEST TIGHTNESS: 0
CONFUSION: 0
ABDOMINAL DISTENTION: 0
CHILLS: 0
ABDOMINAL PAIN: 0
PALPITATIONS: 0
FEVER: 0
BLOOD IN STOOL: 0
DIZZINESS: 0
SHORTNESS OF BREATH: 0
COLOR CHANGE: 0
NUMBNESS: 0
NERVOUS/ANXIOUS: 0
AGITATION: 0
DIAPHORESIS: 0
COUGH: 0

## 2021-10-22 ASSESSMENT — PAIN DESCRIPTION - PAIN TYPE
TYPE: ACUTE PAIN

## 2021-10-22 NOTE — ASSESSMENT & PLAN NOTE
Cardiology, Dr. Payan consulted in the ED and following.  Patient has received heparin bolus and remains on heparin drip on admission  Given ASA, plavix, nitro, metoprolol at outside hospital  Denies chest pain on exam    ECG showing: inferior ST elevation at outside hospital  Started on aspirin, beta-blocker, statin therapy    Patient is for cardiac catheterization on 10/22/2021

## 2021-10-22 NOTE — PROCEDURES
Cardiac Catheterization and Percutaneous Intervention Procedure Report    10/22/2021    Referring MD:     Indication for procedure: ACS <24 hours    Procedures:  · Insertion of 5/6 FR sheath in the right radial artery  · right and left coronary arteriograms  · Left heart catheterization and Left ventriculogram  · Angioplasty and placement of a 2.5 by 12mm Synergy drug-eluting stent in distal  right coronary artery.    Final diagnosis:   S/p successful PCI of distal RCA.  Residual moderate disease in left anterior descending artery.    Recommendations: If patient has angina, consider stress test as an outpatient to evaluate LAD disease.  Guideline directed medical therapy and risk factor management      Coronary arteriograms:  Left main: normal  Left anterior descending: Diffuse long segment moderate 60 to 70% stenosis in proximal to mid LAD stenosis. Apical LAD free of significant disease. Diagonal branches are small.   Left circumflex: Luminal irregularities without significant disease.  Gives first marginal/ramus in proximal portion with , posterolateral branches distally.    Right coronary: Codominant vessel, diffuse moderate disease (30 to 40%) in proximal to midportion .subtotally occluded in distal portion 99% stenosis, co-dominant    Left Heart Catheterization:  Left Ventriculogram: ejection fraction 55%  Left Ventricular EDP: 16 mm Hg   Aortic Valve Gradient: No significant AV gradient noted    Procedure details:  Ashley Gunter was brought to the cardiac catheterization lab where the right wrist was prepped and draped in the usual manner for cardiac catheterization.  The area was anesthetized with lidocaine and a 5/6 FR sheath was inserted into the right radial artery without difficulty. A #3.5 left Lev catheter was advanced to the ostia of the Left coronary artery and arteriograms were recorded.   A #4 right Lev catheter was advanced to the ostia of the right coronary artery and  "arteriograms were recorded. Aortic valve was crossed using #4 right Lev catheter left heart catheterization and left ventriculogram were performed.  Patient underwent percutaneous coronary revascularization as outlined below.  At the completion of the case the sheath was removed and hemostasis achieved utilizing a radial compression band .  Patient was pain-free and hemodynamically stable at the completion of the case.  There were no apparent complications.    Interventional Procedure:     Given the patient's clinical presentation and coronary anatomy, PCI was indicated and we proceeded with the intervention as detailed below.    Indication for PCI:  NSTE- ACD    Pre: 99%, 8 mm length, ERNESTINA 1 flow  Post: 0%, ERNESTINA 3 flow    Lesion complexity  Non-High  Severe calcification No  Bifurcation  No    Guide catheter: AR1 was advanced to the ostia of the right coronary artery.    Guide wire: A 0.014\" mm  Runthrough was advanced into the artery and crossed the lesion.    Balloon pre-dilatation: 2.0 by 12 mm Emerge inflated to 8 MARIBELL to pre-dilate the lesion.    Stent: A 2.5 by 12 mm Synergy drug-eluting  stent was deployed in distal  right coronary artery at 11 MARIBELL.    Anticoagulant: Heparin  Antiplatelet: Prasugrel  EBL <25 cc  Complications: none  Specimens: none  Contrast: 63cc  Fluorotime : see cath lab flowsheet      Sedation: I supervised moderate sedation over a trained independent observer.    Sedation start time: 11:17  End time: 11:58      Electronically signed by   Calvin Bello M.D., JULIO  Interventional cardiologist  10/22/2021  12:08 PM            "

## 2021-10-22 NOTE — ED TRIAGE NOTES
.  Chief Complaint   Patient presents with   • Chest Pain      Pt BIB EMS from [facility]. Pt went to local ED after sudden onset of left sided jaw pain radiating into her left neck at 0900, Pt denies SOB, nausea, dizziness.     Pt presents on Heparin drip at 12ui/kg. Pt denies pain at this time.

## 2021-10-22 NOTE — ED NOTES
Medicated pt per MAR. Pt asking when she will go to cath lab. Cath lab states ET of 11am. Notified pt of POC. Pt still denies CP

## 2021-10-22 NOTE — PROGRESS NOTES
"Cardiology Follow Up Progress Note    Date of Service  10/22/2021    Attending Physician  Brooke French D.O.    Chief Complaint   Jaw pain and left shoulder pain with substernal chest pressure     HPI  Ashley Gunter is a 60 y.o. female admitted 10/21/2021 with jaw pain with left shoulder pain leading to substernal chest pressure. Transferred from [facility] with ACS.     Family history significant for her sister dying at age 34 of a \"arrhythmia\".  Mother  age 80 of ovarian cancer.  Father  at age 83 of \"congestive heart failure\".    Interim Events  Patient denies any chest pain, sob, dizziness or palpitations. Heparin gtt     NSR on the monitor   bp stable   Trop trending up 839    Review of Systems  Review of Systems   Constitutional: Negative for chills, diaphoresis and fever.   HENT: Negative for nosebleeds and trouble swallowing.    Respiratory: Negative for cough, chest tightness and shortness of breath.    Cardiovascular: Negative for chest pain, palpitations and leg swelling.   Gastrointestinal: Negative for abdominal distention, abdominal pain and blood in stool.   Genitourinary: Negative for hematuria.   Skin: Negative for color change.   Neurological: Negative for dizziness, syncope and numbness.   Psychiatric/Behavioral: Negative for agitation and confusion. The patient is not nervous/anxious.        Vital signs in last 24 hours  Temp:  [37.3 °C (99.2 °F)] 37.3 °C (99.2 °F)  Pulse:  [52-74] 64  Resp:  [12-20] 16  BP: ()/() 95/59  SpO2:  [92 %-97 %] 95 %    Physical Exam  Physical Exam  Vitals and nursing note reviewed.   Constitutional:       Appearance: Normal appearance.   HENT:      Head: Normocephalic and atraumatic.   Eyes:      Pupils: Pupils are equal, round, and reactive to light.   Cardiovascular:      Rate and Rhythm: Normal rate and regular rhythm.      Heart sounds: Normal heart sounds. No murmur heard.     Pulmonary:      Effort: Pulmonary effort is normal.      Breath " sounds: Normal breath sounds.   Abdominal:      General: Abdomen is flat.   Musculoskeletal:      Cervical back: Normal range of motion.   Skin:     General: Skin is warm and dry.   Neurological:      General: No focal deficit present.      Mental Status: She is alert and oriented to person, place, and time.   Psychiatric:         Mood and Affect: Mood normal.         Behavior: Behavior normal.         Thought Content: Thought content normal.         Judgment: Judgment normal.         Lab Review  Lab Results   Component Value Date/Time    WBC 7.2 10/21/2021 09:43 PM    RBC 4.65 10/21/2021 09:43 PM    HEMOGLOBIN 14.3 10/21/2021 09:43 PM    HEMATOCRIT 41.8 10/21/2021 09:43 PM    MCV 89.9 10/21/2021 09:43 PM    MCH 30.8 10/21/2021 09:43 PM    MCHC 34.2 10/21/2021 09:43 PM    MPV 10.6 10/21/2021 09:43 PM      Lab Results   Component Value Date/Time    SODIUM 139 10/21/2021 09:43 PM    POTASSIUM 3.9 10/21/2021 09:43 PM    CHLORIDE 107 10/21/2021 09:43 PM    CO2 21 10/21/2021 09:43 PM    GLUCOSE 107 (H) 10/21/2021 09:43 PM    BUN 10 10/21/2021 09:43 PM    CREATININE 0.70 10/21/2021 09:43 PM      Lab Results   Component Value Date/Time    ASTSGOT 69 (H) 10/21/2021 09:43 PM    ALTSGPT 28 10/21/2021 09:43 PM     Lab Results   Component Value Date/Time    CHOLSTRLTOT 235 (H) 10/21/2021 09:43 PM     (H) 10/21/2021 09:43 PM    HDL 45 10/21/2021 09:43 PM    TRIGLYCERIDE 177 (H) 10/21/2021 09:43 PM    TROPONINT 839 (H) 10/22/2021 05:35 AM       No results for input(s): NTPROBNP in the last 72 hours.    Cardiac Imaging and Procedures Review  EKG:   Very slight ST elevation limited to lead II only T wave inversion in lead III   no   other signs of acute ischemia normal axis normal intervals   Electronically Signed On 10- 1:45:11 PDT by JEY SALAZAR MD    Echocardiogram:  Pending     Cardiac Catheterization:  Pending       Assessment/Plan  No new Assessment & Plan notes have been filed under this hospital service  since the last note was generated.  Service: Cardiology    1. NSTEMI:  - trop trending up 800s  - continue heparin gtt  - plan for coronary angiogram   - continue asa 81mg qd, atorvastatin 80mg qd, and changed metoprolol tartrate to succinate 25mg qd   - echo ordered and pending     Thank you for allowing me to participate in the care of this patient.  I will continue to follow this patient    Please contact me with any questions.    JULEE Wolf.

## 2021-10-22 NOTE — PROGRESS NOTES
"Hospital Medicine Daily Progress Note    Date of Service  10/22/2021    Chief Complaint  Ashley Gunter is a 60 y.o. female admitted 10/21/2021 with chest pain    Hospital Course  This is a 60 year old female with PMHx of hypertension, former tobacco use, and obesity who was admitted on 10/21/2021 after being transferred from an outside facility due to ACS/NSTEMI.    As per cardiology note, \"in the ER initial ECG showed subtle inferior ST elevation with reciprocal high lateral T wave abnormalities.  She was aggressively treated with aspirin, Plavix, nitroglycerin, metoprolol and heparin with complete resolution of her chest discomfort and normalization of her EKG.  Serial troponin levels reportedly were 0.1, 0.3, 2.0.\"    Cardiology recommends cardiac catheterization, patient is currently on heparin drip. ECHO ordered.    Interval Problem Update  Patient denies any current chest pain.    Cardiology recommends cardiac catheterization, patient is currently on heparin drip. ECHO ordered.    I have personally seen and examined the patient at bedside. I discussed the plan of care with patient, family and bedside RN.    Consultants/Specialty  cardiology    Code Status  Full Code    Disposition  Patient is not medically cleared.   Anticipate discharge to to home with close outpatient follow-up.  I have placed the appropriate orders for post-discharge needs.    Review of Systems  Review of Systems   All other systems reviewed and are negative.       Physical Exam  Temp:  [37.3 °C (99.2 °F)] 37.3 °C (99.2 °F)  Pulse:  [52-74] 64  Resp:  [12-20] 16  BP: ()/() 96/57  SpO2:  [92 %-98 %] 97 %    Physical Exam  Vitals and nursing note reviewed.   Constitutional:       General: She is not in acute distress.     Appearance: Normal appearance.   HENT:      Head: Normocephalic and atraumatic.      Mouth/Throat:      Mouth: Mucous membranes are moist.      Pharynx: No oropharyngeal exudate.   Eyes:      Extraocular " Movements: Extraocular movements intact.      Pupils: Pupils are equal, round, and reactive to light.   Cardiovascular:      Rate and Rhythm: Normal rate and regular rhythm.      Pulses: Normal pulses.      Heart sounds: No murmur heard.   No friction rub. No gallop.    Pulmonary:      Effort: Pulmonary effort is normal. No respiratory distress.      Breath sounds: No wheezing, rhonchi or rales.   Abdominal:      General: Bowel sounds are normal. There is no distension.      Palpations: Abdomen is soft. There is no mass.      Tenderness: There is no abdominal tenderness.   Musculoskeletal:         General: No swelling or tenderness. Normal range of motion.      Cervical back: Normal range of motion. No rigidity. No muscular tenderness.      Right lower leg: No edema.      Left lower leg: No edema.   Skin:     General: Skin is warm and dry.      Capillary Refill: Capillary refill takes less than 2 seconds.      Findings: No erythema or rash.   Neurological:      General: No focal deficit present.      Mental Status: She is alert and oriented to person, place, and time.      Motor: No weakness.      Gait: Gait normal.         Fluids  No intake or output data in the 24 hours ending 10/22/21 1049    Laboratory  Recent Labs     10/21/21  2143   WBC 7.2   RBC 4.65   HEMOGLOBIN 14.3   HEMATOCRIT 41.8   MCV 89.9   MCH 30.8   MCHC 34.2   RDW 41.0   PLATELETCT 214   MPV 10.6     Recent Labs     10/21/21  2143   SODIUM 139   POTASSIUM 3.9   CHLORIDE 107   CO2 21   GLUCOSE 107*   BUN 10   CREATININE 0.70   CALCIUM 9.3     Recent Labs     10/21/21  2143   APTT 73.9*   INR 1.04         Recent Labs     10/21/21  2143   TRIGLYCERIDE 177*   HDL 45   *       Imaging  CL-LEFT HEART CATHETERIZATION WITH POSSIBLE INTERVENTION    (Results Pending)   EC-ECHOCARDIOGRAM COMPLETE W/O CONT    (Results Pending)        Assessment/Plan  * NSTEMI (non-ST elevated myocardial infarction) (HCC)- (present on admission)  Assessment &  Plan  Cardiology, Dr. Payan consulted in the ED and following.  Patient has received heparin bolus and remains on heparin drip on admission  Given ASA, plavix, nitro, metoprolol at outside hospital  Denies chest pain on exam    ECG showing: inferior ST elevation at outside hospital  Started on aspirin, beta-blocker, statin therapy    Patient is for cardiac catheterization on 10/22/2021    HLD (hyperlipidemia)- (present on admission)  Assessment & Plan  High-dose statin ordered    HTN (hypertension)- (present on admission)  Assessment & Plan  Continue home meds  Admitted with telemetry       VTE prophylaxis: SCDs/TEDs and therapeutic anticoagulation with Heparin Drip    I have performed a physical exam and reviewed and updated ROS and Plan today (10/22/2021). In review of yesterday's note (10/21/2021), there are no changes except as documented above.

## 2021-10-22 NOTE — ED NOTES
Med Rec partially completed: per pt at bedside. Pt reports 10 mg of losartan daily, pharmacy to be called to verify dosing. Pharmacy open again 10/22/21 at 1000.    No ORAL antibiotics in last 30 days    Preferred Pharmacy: [facility] [place]    Pt confirmed following allergies:  No Known Allergies     Pt's home medications:     Medication Sig   • losartan (COZAAR) 25 MG Tab Take 10 mg by mouth every day.

## 2021-10-22 NOTE — ED NOTES
Received report from Maritza HENDRICKS. Assumed pt care. Heparin anti-xa lab ordered and collected and sent to lab.   Pt provided bedside commode, pt able to stand w/ steady gait to commode and back

## 2021-10-22 NOTE — ED PROVIDER NOTES
ED Provider Note        Primary care provider: No primary care provider on file.    I verified that the patient was wearing a mask and I was wearing appropriate PPE every time I entered the room. The patient's mask was on the patient at all times during my encounter except for a brief view of the oropharynx.      CHIEF COMPLAINT  Chief Complaint   Patient presents with   • Chest Pain       HPI  Ashley Gunter is a 60 y.o. female who presents to the Emergency Department with chief complaint of STEMI.  Patient was seen in outlying facility she had onset of chest pain 9:00 this morning this is now approximately 10 hours prior.  She stated that the pain started with a dull stabbing pain in her jaw and then progressed to a tightness in her chest she went to the outside facility she had initial negative troponin over several redraws her troponin continued to climb she was seen by cardiology at that facility who is concerned that her initial couple of EKGs did show some ST elevation inferiorly with some minimal reciprocal changes therefore she was given heparin bolus and heparin drip she was given aspirin Plavix nitroglycerin she was also given metoprolol and she was transferred to our facility for further evaluation and treatment.  Patient states that arrival that she has absolutely no chest pain anymore she has been chest pain-free for several hours no ongoing shortness of breath no nausea no diaphoresis she has never had similar situation she has never had any evaluation of her coronary arteries.  She is an occasional smoker, no diabetes history of hypertension no other acute symptoms or concerns at this time.  No recent fevers chills cough trouble breathing    REVIEW OF SYSTEMS  10 systems reviewed and otherwise negative, pertinent positives and negatives listed in the history of present illness.    PAST MEDICAL HISTORY   has a past medical history of Hypertension.    SURGICAL HISTORY  patient denies any surgical  "history    SOCIAL HISTORY  Social History     Tobacco Use   • Smoking status: Current Some Day Smoker   Substance Use Topics   • Alcohol use: Yes   • Drug use: Not Currently      Social History     Substance and Sexual Activity   Drug Use Not Currently       FAMILY HISTORY  Non-Contributory    CURRENT MEDICATIONS  Home Medications    **Home medications have not yet been reviewed for this encounter**         ALLERGIES  Not on File    PHYSICAL EXAM  VITAL SIGNS: /82   Pulse 66   Temp 37.3 °C (99.2 °F) (Oral)   Resp 16   Ht 1.626 m (5' 4\")   Wt 78 kg (172 lb)   SpO2 97%   BMI 29.52 kg/m²   Pulse ox interpretation: I interpret this pulse ox as normal.  Constitutional: Alert and oriented x 3, minimal distress  HEENT: Atraumatic normocephalic, pupils are equal round, extraocular movements are intact. The nares is clear, external ears are normal, mouth shows moist mucous membranes  Neck: no obvious JVD or tracheal deviation  Cardiovascular: Regular rate and rhythm no murmur rub or gallop   Thorax & Lungs: No respiratory distress, no wheezes rales or rhonchi, No chest tenderness.   GI: Soft nontender nondistended positive bowel sounds, no peritoneal signs  Skin: Warm dry no obvious acute rash or lesion  Musculoskeletal: Moving all extremities with normal range strength, no acute  deformity  Neurologic: Cranial nerves III through XII are grossly intact, no sensory deficit, no cerebellar dysfunction   Psychiatric: Appropriate affect for situation at this time      DIAGNOSTIC STUDIES / PROCEDURES  LABS      Results for orders placed or performed during the hospital encounter of 10/21/21   aPTT   Result Value Ref Range    APTT 73.9 (H) 24.7 - 36.0 sec   Prothrombin Time   Result Value Ref Range    PT 13.3 12.0 - 14.6 sec    INR 1.04 0.87 - 1.13   Heparin Xa (Unfractionated)   Result Value Ref Range    Heparin Xa (UFH) 0.33 IU/mL   URINE DRUG SCREEN   Result Value Ref Range    Amphetamines Urine Negative Negative    " Barbiturates Negative Negative    Benzodiazepines Negative Negative    Cocaine Metabolite Negative Negative    Methadone Negative Negative    Opiates Negative Negative    Oxycodone Negative Negative    Phencyclidine -Pcp Negative Negative    Propoxyphene Negative Negative    Cannabinoid Metab Negative Negative   Lipid Profile   Result Value Ref Range    Cholesterol,Tot 235 (H) 100 - 199 mg/dL    Triglycerides 177 (H) 0 - 149 mg/dL    HDL 45 >=40 mg/dL     (H) <100 mg/dL   Comp Metabolic Panel   Result Value Ref Range    Sodium 139 135 - 145 mmol/L    Potassium 3.9 3.6 - 5.5 mmol/L    Chloride 107 96 - 112 mmol/L    Co2 21 20 - 33 mmol/L    Anion Gap 11.0 7.0 - 16.0    Glucose 107 (H) 65 - 99 mg/dL    Bun 10 8 - 22 mg/dL    Creatinine 0.70 0.50 - 1.40 mg/dL    Calcium 9.3 8.5 - 10.5 mg/dL    AST(SGOT) 69 (H) 12 - 45 U/L    ALT(SGPT) 28 2 - 50 U/L    Alkaline Phosphatase 57 30 - 99 U/L    Total Bilirubin 0.8 0.1 - 1.5 mg/dL    Albumin 4.2 3.2 - 4.9 g/dL    Total Protein 7.0 6.0 - 8.2 g/dL    Globulin 2.8 1.9 - 3.5 g/dL    A-G Ratio 1.5 g/dL   CBC WITH DIFFERENTIAL   Result Value Ref Range    WBC 7.2 4.8 - 10.8 K/uL    RBC 4.65 4.20 - 5.40 M/uL    Hemoglobin 14.3 12.0 - 16.0 g/dL    Hematocrit 41.8 37.0 - 47.0 %    MCV 89.9 81.4 - 97.8 fL    MCH 30.8 27.0 - 33.0 pg    MCHC 34.2 33.6 - 35.0 g/dL    RDW 41.0 35.9 - 50.0 fL    Platelet Count 214 164 - 446 K/uL    MPV 10.6 9.0 - 12.9 fL    Neutrophils-Polys 62.90 44.00 - 72.00 %    Lymphocytes 27.40 22.00 - 41.00 %    Monocytes 7.30 0.00 - 13.40 %    Eosinophils 1.80 0.00 - 6.90 %    Basophils 0.30 0.00 - 1.80 %    Immature Granulocytes 0.30 0.00 - 0.90 %    Nucleated RBC 0.00 /100 WBC    Neutrophils (Absolute) 4.55 2.00 - 7.15 K/uL    Lymphs (Absolute) 1.98 1.00 - 4.80 K/uL    Monos (Absolute) 0.53 0.00 - 0.85 K/uL    Eos (Absolute) 0.13 0.00 - 0.51 K/uL    Baso (Absolute) 0.02 0.00 - 0.12 K/uL    Immature Granulocytes (abs) 0.02 0.00 - 0.11 K/uL    NRBC (Absolute)  0.00 K/uL   TSH   Result Value Ref Range    TSH 1.280 0.380 - 5.330 uIU/mL   Magnesium   Result Value Ref Range    Magnesium 2.3 1.5 - 2.5 mg/dL   ESTIMATED GFR   Result Value Ref Range    GFR If African American >60 >60 mL/min/1.73 m 2    GFR If Non African American >60 >60 mL/min/1.73 m 2   EKG   Result Value Ref Range    Report       [facility] Emergency Dept.    Test Date:  2021-10-21  Pt Name:    NATASHA GILL                 Department: ER  MRN:        [id number]                      Room:        09  Gender:     Female                       Technician:  :        [id number]                   Requested By:JEY SALAZAR  Order #:    [id number]                    Reading MD: JEY SALAZAR MD    Measurements  Intervals                                Axis  Rate:       61                           P:          19  IL:         164                          QRS:        52  QRSD:       96                           T:          18  QT:         483  QTc:        487    Interpretive Statements  Very slight ST elevation limited to lead II only T wave inversion in lead III  no  other signs of acute ischemia normal axis normal intervals  Electronically Signed On 10- 1:45:11 PDT by JEY SALAZAR MD         All labs reviewed by me.      RADIOLOGY  No orders to display         COURSE & MEDICAL DECISION MAKING  Pertinent Labs & Imaging studies reviewed. (See chart for details)    9:40 PM - Patient seen and examined at bedside.     Coagulation studies were ordered in light of need for heparin drip    Patient noted to have slightly elevated blood pressure likely circumstantial secondary to presenting complaint. Referred to primary care physician for further evaluation.      Medical Decision Making: Very pleasant 60-year-old female with acute coronary syndrome.  Troponin I of 2.0 at outlying facility.  Patient is chest pain-free here.  Her EKG shows some very minimal changes without any  "STEMI criteria.  She is already on heparin drip she has received beta-blocker Plavix aspirin.  She has been seen by cardiologist Dr. Lynch, we are both in agreement that patient does not require emergent cardiac catheterization at this point but can be done on a more controlled basis tomorrow.  I discussed case with Dr. Gamble and she is admitted for ongoing evaluation and treatment admitted in guarded condition.    /82   Pulse 66   Temp 37.3 °C (99.2 °F) (Oral)   Resp 16   Ht 1.626 m (5' 4\")   Wt 78 kg (172 lb)   SpO2 97%   BMI 29.52 kg/m²         FINAL IMPRESSION  1. Chest pain, unspecified type Active   2. NSTEMI (non-ST elevated myocardial infarction) ([facility]) Active          This dictation has been created using voice recognition software and/or scribes. The accuracy of the dictation is limited by the abilities of the software and the expertise of the scribes. I expect there may be some errors of grammar and possibly content. I made every attempt to manually correct the errors within my dictation. However, errors related to voice recognition software and/or scribes may still exist and should be interpreted within the appropriate context.            "

## 2021-10-22 NOTE — PROGRESS NOTES
Assumed care of PT A&O 4. Pt resting in bed with no signs of labored breathing. On RA. Tele monitor in place, cardiac rhythm being monitored. Call light within reach, bed in lowest position, upper bed rails up. Pt was updated on plan of care for the day. Will continue to monitor. Cath site to right radial clean, dry and soft.

## 2021-10-22 NOTE — H&P
Hospital Medicine History & Physical Note    Date of Service  10/22/2021    Primary Care Physician  No primary care provider on file.    Consultants  Cardiology, Dr. Payan    Code Status  Full Code    Chief Complaint  Chief Complaint   Patient presents with   • Chest Pain       History of Presenting Illness  Ashley Gunter is a 60 y.o. female with a past medical hx of HTN who presented 10/21/2021 as a transfer from [facility] where she initially presented with a 10 hour hx of worsening chest pain that began as an aching in her jaw and L. Shoulder and radiated to her midline chest.   In the ER patient's  initial ECG showed subtle inferior ST elevation with reciprocal high lateral T wave abnormalities.  She was aggressively treated with aspirin, Plavix, nitroglycerin, metoprolol and heparin bolus.   Patient denies chest pain and is seen resting comfortably in the ED on exam.     Cardiology, Dr. Payan is consulted in ED and following    I discussed the plan of care with patient.    Review of Systems  Review of Systems   Cardiovascular: Positive for chest pain.   All other systems reviewed and are negative.      Past Medical History   has a past medical history of Hypertension.    Surgical History  none    Family History  Father with heart disease, sister passed at 35 yo 2/2 heart disease  Family history reviewed with patient. There is family history that is pertinent to the chief complaint.     Social History   reports that she has been smoking. She does not have any smokeless tobacco history on file. She reports current alcohol use. She reports previous drug use. reviewed.    Allergies  No Known Allergies    Medications  Prior to Admission Medications   Prescriptions Last Dose Informant Patient Reported? Taking?   losartan (COZAAR) 25 MG Tab   Yes Yes   Sig: Take 10 mg by mouth every day.      Facility-Administered Medications: None       Physical Exam  Temp:  [37.3 °C (99.2 °F)] 37.3 °C (99.2  °F)  Pulse:  [63-74] 69  Resp:  [16-20] 18  BP: ()/() 95/52  SpO2:  [92 %-97 %] 92 %  Blood Pressure: 125/101   Temperature: 37.3 °C (99.2 °F)   Pulse: 67   Respiration: 20   Pulse Oximetry: 95 %       Physical Exam     Constitutional: Resting comfortably in NAD   HENT: Normocephalic, no obvious evidence of acute trauma.  Eyes: No scleral icterus. Normal conjunctiva   Neck: Comfortable movement without any obvious restriction in the range of motion.  Cardiovascular: Upon ascultation I appreciate a regular heart rhythm and a normal rate with no murmurs, rubs or gallops  Thorax & Lungs: No respiratory distress. No wheezing, rales or rhonchi heard on ausculation.  there is no obvious chest wall tenderness. I appreciate normal air movement throughout.   Abdomen: The abdomen is not visibly distended. Upon palpation, I find it to be without tenderness.  No mass appreciated.  Skin: The exposed portions of skin reveal no obvious rash or other abnormalities.  Extremities/Musculoskeletal: no lower extremity edema with no asymmetry.  Neurologic: Alert & oriented. No focal deficits observed.   Psychiatric: Normal affect appropriate for the clinical situation.    Laboratory:  Recent Labs     10/21/21  2143   WBC 7.2   RBC 4.65   HEMOGLOBIN 14.3   HEMATOCRIT 41.8   MCV 89.9   MCH 30.8   MCHC 34.2   RDW 41.0   PLATELETCT 214   MPV 10.6     Recent Labs     10/21/21  2143   SODIUM 139   POTASSIUM 3.9   CHLORIDE 107   CO2 21   GLUCOSE 107*   BUN 10   CREATININE 0.70   CALCIUM 9.3     Recent Labs     10/21/21  2143   ALTSGPT 28   ASTSGOT 69*   ALKPHOSPHAT 57   TBILIRUBIN 0.8   GLUCOSE 107*     Recent Labs     10/21/21  2143   APTT 73.9*   INR 1.04     No results for input(s): NTPROBNP in the last 72 hours.  Recent Labs     10/21/21  2143   TRIGLYCERIDE 177*   HDL 45   *     No results for input(s): TROPONINT in the last 72 hours.    Imaging:  CL-LEFT HEART CATHETERIZATION WITH POSSIBLE INTERVENTION    (Results  Pending)           Assessment/Plan:  I anticipate this patient will require at least two midnights for appropriate medical management, necessitating inpatient admission.    NSTEMI (non-ST elevated myocardial infarction) (HCC)- (present on admission)  Assessment & Plan  Cardiology, Dr. Payan consulted in the ED and following.  Patient has received heparin bolus and remains on heparin drip on admission  Given ASA, plavix, nitro, metoprolol at outside hospital  Denies chest pain on exam    ECG showing: inferior ST elevation at outside hospital  Admitted with telemetry  -c/w IVF  - High intensity w/  Atorvastatin   - F/u with Lipid and A1c level      HLD (hyperlipidemia)- (present on admission)  Assessment & Plan  Lipid panel ordered to assess  Atorvastatin qd     HTN (hypertension)- (present on admission)  Assessment & Plan  Continue home meds  Admitted with telemetry      VTE prophylaxis: SCDs/TEDs

## 2021-10-22 NOTE — CONSULTS
"Cardiology Initial Consultation    Date of Service  10/21/2021    Referring Physician  Ej Dias M.D.    Reason for Consultation  STEMI    History of Presenting Illness  Ashley Gunter is a 60 y.o. female with a past medical history of hypertension who presented 10/21/2021 having been transferred from [facility] in [place], [place] with acute coronary syndrome.    The patient states that around 10:00 this morning she began developing aching jaw pain followed by left shoulder pain and subsequent substernal chest pressure.  She took half of an Ativan without any improvement of her symptoms.  She was taken to the emergency room.    In the ER initial ECG showed subtle inferior ST elevation with reciprocal high lateral T wave abnormalities.  She was aggressively treated with aspirin, Plavix, nitroglycerin, metoprolol and heparin with complete resolution of her chest discomfort and normalization of her EKG.  Serial troponin levels reportedly were 0.1, 0.3, 2.0.    Currently she is having no chest jaw or shoulder discomfort.  She had been on losartan with her dose having been increased over the past year.  However she was recently on a trip to [facility] and had been out of her medications for a week and had restarted it approximately 2 days ago.    The patient has no prior history of heart disease.  She has no history of diabetes mellitus, hypercholesterolemia.  She has smoked socially in the past.  She drinks 2 glasses of wine a day.    Family history significant for her sister dying at age 34 of a \"arrhythmia\".  Mother  age 80 of ovarian cancer.  Father  at age 83 of \"congestive heart failure\".    Review of Systems  Review of Systems   Respiratory: Negative for chest tightness and shortness of breath.    Cardiovascular: Negative for palpitations.   Gastrointestinal: Negative for abdominal pain and nausea.   Neurological: Negative for dizziness and headaches.   All other systems reviewed and " are negative.      Past Medical History   has a past medical history of Hypertension.    Surgical History  1.  Bilateral total hip replacements.  2009.  2014.  2.  Bilateral breast reduction 2017.  3.  Left ACL repair.    Family History  See above    Social History   reports that she has been smoking. She does not have any smokeless tobacco history on file. She reports current alcohol use. She reports previous drug use.    Medications  Prior to Admission Medications   Prescriptions Last Dose Informant Patient Reported? Taking?   losartan (COZAAR) 25 MG Tab   Yes Yes   Sig: Take 10 mg by mouth every day.      Facility-Administered Medications: None       Allergies  No Known Allergies    Vital signs in last 24 hours  Temp:  [37.3 °C (99.2 °F)] 37.3 °C (99.2 °F)  Pulse:  [66] 66  Resp:  [16] 16  BP: (150)/(82) 150/82  SpO2:  [97 %] 97 %    Physical Exam  Physical Exam  Constitutional:       General: She is not in acute distress.  HENT:      Head: Normocephalic.   Eyes:      General: No scleral icterus.     Conjunctiva/sclera: Conjunctivae normal.      Pupils: Pupils are equal, round, and reactive to light.   Neck:      Thyroid: No thyromegaly.      Vascular: No carotid bruit.      Comments: Normal jugular venous pressure.  Cardiovascular:      Rate and Rhythm: Normal rate and regular rhythm.      Pulses:           Carotid pulses are 1+ on the right side and 1+ on the left side.       Radial pulses are 1+ on the right side and 1+ on the left side.        Posterior tibial pulses are 1+ on the right side and 1+ on the left side.      Heart sounds: S1 normal and S2 normal. No murmur heard.   No friction rub. No gallop.    Pulmonary:      Effort: Pulmonary effort is normal.      Breath sounds: Normal breath sounds. No wheezing, rhonchi or rales.   Abdominal:      General: Bowel sounds are normal. There is no abdominal bruit.      Palpations: Abdomen is soft. There is no mass or pulsatile mass.      Tenderness: There is no  abdominal tenderness.   Lymphadenopathy:      Cervical: No cervical adenopathy.   Skin:     General: Skin is warm and dry.      Nails: There is no clubbing.   Neurological:      Mental Status: She is alert and oriented to person, place, and time.   Psychiatric:         Behavior: Behavior normal.         Lab Review  No results found for: WBC, RBC, HEMOGLOBIN, HEMATOCRIT, MCV, MCH, MCHC, MPV   No results found for: SODIUM, POTASSIUM, CHLORIDE, CO2, GLUCOSE, BUN, CREATININE, BUNCREATRAT, GLOMRATE   No results found for: ASTSGOT, ALTSGPT  No results found for: CHOLSTRLTOT, LDL, HDL, TRIGLYCERIDE, TROPONINT    No results for input(s): NTPROBNP in the last 72 hours.    Cardiac Imaging and Procedures Review  EKG:  My personal interpretation of the EKG dated 10/21/2021 is sinus rhythm    Imaging  Chest X-Ray: Pending    Assessment  1.  ACS/NSTEMI with transient mild inferior ST elevation.  2.  Hypertension.  3.  H/O tobacco use.  4.  Moderate alcohol use.    Recommendation Discussion  1.  The patient is currently not having a STEMI with resolution of her ACS symptoms and EKG abnormalities.  2.  I had a detailed discussion with the patient concerning the nature of her current cardiac condition and the recommendation of proceeding with a cardiac catheterization explaining the indication for the procedure, the procedure itself and the potential therapeutic scenarios including medical therapy, PCI or CABG and the attendant risk of the procedure which include but are not limited to death, myocardial infarction, stroke, emergent bypass surgery, contrast dye related reaction or renal injury, bleeding or infection.  She understood the situation and wished to proceed.  3.  The patient is clinically stable with no ongoing ischemic ECG changes.  4.  Will continue comprehensive therapy with IV heparin, metoprolol, Nitropaste, aspirin.  Add atorvastatin.  5.  Comprehensive laboratory tests CBC, CMP, troponin level, lipid panel.  6.   Discussed plan of care with Ej Dias MD, ER physician.    Thank you for allowing me to participate in the care of this patient.    Please contact me with any questions.    Raheem Markham M.D.   Cardiologist, [facility]  [phone]

## 2021-10-23 ENCOUNTER — APPOINTMENT (OUTPATIENT)
Dept: CARDIOLOGY | Facility: MEDICAL CENTER | Age: 61
DRG: 247 | End: 2021-10-23
Attending: NURSE PRACTITIONER
Payer: COMMERCIAL

## 2021-10-23 VITALS
SYSTOLIC BLOOD PRESSURE: 125 MMHG | TEMPERATURE: 97.2 F | BODY MASS INDEX: 29.37 KG/M2 | DIASTOLIC BLOOD PRESSURE: 70 MMHG | OXYGEN SATURATION: 95 % | WEIGHT: 172 LBS | RESPIRATION RATE: 14 BRPM | HEART RATE: 71 BPM | HEIGHT: 64 IN

## 2021-10-23 LAB
ANION GAP SERPL CALC-SCNC: 9 MMOL/L (ref 7–16)
BUN SERPL-MCNC: 10 MG/DL (ref 8–22)
CALCIUM SERPL-MCNC: 8.5 MG/DL (ref 8.5–10.5)
CHLORIDE SERPL-SCNC: 113 MMOL/L (ref 96–112)
CO2 SERPL-SCNC: 17 MMOL/L (ref 20–33)
CREAT SERPL-MCNC: 0.72 MG/DL (ref 0.5–1.4)
ERYTHROCYTE [DISTWIDTH] IN BLOOD BY AUTOMATED COUNT: 42 FL (ref 35.9–50)
GLUCOSE SERPL-MCNC: 102 MG/DL (ref 65–99)
HCT VFR BLD AUTO: 36.9 % (ref 37–47)
HGB BLD-MCNC: 12.3 G/DL (ref 12–16)
LV EJECT FRACT  99904: 60
MCH RBC QN AUTO: 30.4 PG (ref 27–33)
MCHC RBC AUTO-ENTMCNC: 33.3 G/DL (ref 33.6–35)
MCV RBC AUTO: 91.3 FL (ref 81.4–97.8)
PLATELET # BLD AUTO: 158 K/UL (ref 164–446)
PMV BLD AUTO: 10.2 FL (ref 9–12.9)
POTASSIUM SERPL-SCNC: 3.8 MMOL/L (ref 3.6–5.5)
RBC # BLD AUTO: 4.04 M/UL (ref 4.2–5.4)
SODIUM SERPL-SCNC: 139 MMOL/L (ref 135–145)
WBC # BLD AUTO: 5.1 K/UL (ref 4.8–10.8)

## 2021-10-23 PROCEDURE — 700102 HCHG RX REV CODE 250 W/ 637 OVERRIDE(OP): Performed by: NURSE PRACTITIONER

## 2021-10-23 PROCEDURE — 700102 HCHG RX REV CODE 250 W/ 637 OVERRIDE(OP): Performed by: STUDENT IN AN ORGANIZED HEALTH CARE EDUCATION/TRAINING PROGRAM

## 2021-10-23 PROCEDURE — 99239 HOSP IP/OBS DSCHRG MGMT >30: CPT | Performed by: INTERNAL MEDICINE

## 2021-10-23 PROCEDURE — 80048 BASIC METABOLIC PNL TOTAL CA: CPT

## 2021-10-23 PROCEDURE — 700102 HCHG RX REV CODE 250 W/ 637 OVERRIDE(OP): Performed by: GENERAL PRACTICE

## 2021-10-23 PROCEDURE — 36415 COLL VENOUS BLD VENIPUNCTURE: CPT

## 2021-10-23 PROCEDURE — A9270 NON-COVERED ITEM OR SERVICE: HCPCS | Performed by: STUDENT IN AN ORGANIZED HEALTH CARE EDUCATION/TRAINING PROGRAM

## 2021-10-23 PROCEDURE — 93306 TTE W/DOPPLER COMPLETE: CPT

## 2021-10-23 PROCEDURE — 85027 COMPLETE CBC AUTOMATED: CPT

## 2021-10-23 PROCEDURE — A9270 NON-COVERED ITEM OR SERVICE: HCPCS | Performed by: INTERNAL MEDICINE

## 2021-10-23 PROCEDURE — A9270 NON-COVERED ITEM OR SERVICE: HCPCS | Performed by: NURSE PRACTITIONER

## 2021-10-23 PROCEDURE — 700105 HCHG RX REV CODE 258: Performed by: INTERNAL MEDICINE

## 2021-10-23 PROCEDURE — 93306 TTE W/DOPPLER COMPLETE: CPT | Mod: 26 | Performed by: INTERNAL MEDICINE

## 2021-10-23 PROCEDURE — 700102 HCHG RX REV CODE 250 W/ 637 OVERRIDE(OP): Performed by: INTERNAL MEDICINE

## 2021-10-23 PROCEDURE — 99232 SBSQ HOSP IP/OBS MODERATE 35: CPT | Performed by: INTERNAL MEDICINE

## 2021-10-23 PROCEDURE — A9270 NON-COVERED ITEM OR SERVICE: HCPCS | Performed by: GENERAL PRACTICE

## 2021-10-23 RX ORDER — ASPIRIN 81 MG/1
81 TABLET ORAL DAILY
Qty: 30 TABLET | Refills: 1 | Status: SHIPPED | OUTPATIENT
Start: 2021-10-24

## 2021-10-23 RX ORDER — LOSARTAN POTASSIUM 25 MG/1
25 TABLET ORAL
Status: DISCONTINUED | OUTPATIENT
Start: 2021-10-23 | End: 2021-10-23 | Stop reason: HOSPADM

## 2021-10-23 RX ORDER — METOPROLOL SUCCINATE 25 MG/1
25 TABLET, EXTENDED RELEASE ORAL DAILY
Qty: 30 TABLET | Refills: 1 | Status: SHIPPED | OUTPATIENT
Start: 2021-10-24 | End: 2021-11-23

## 2021-10-23 RX ORDER — ATORVASTATIN CALCIUM 80 MG/1
80 TABLET, FILM COATED ORAL DAILY
Qty: 30 TABLET | Refills: 1 | Status: SHIPPED | OUTPATIENT
Start: 2021-10-24 | End: 2021-12-27 | Stop reason: SDUPTHER

## 2021-10-23 RX ORDER — PRASUGREL 10 MG/1
10 TABLET, FILM COATED ORAL DAILY
Qty: 30 TABLET | Refills: 1 | Status: SHIPPED | OUTPATIENT
Start: 2021-10-24 | End: 2021-12-27 | Stop reason: SDUPTHER

## 2021-10-23 RX ADMIN — SODIUM CHLORIDE: 9 INJECTION, SOLUTION INTRAVENOUS at 00:27

## 2021-10-23 RX ADMIN — PRASUGREL 10 MG: 10 TABLET, FILM COATED ORAL at 05:46

## 2021-10-23 RX ADMIN — METOPROLOL SUCCINATE 25 MG: 25 TABLET, EXTENDED RELEASE ORAL at 05:43

## 2021-10-23 RX ADMIN — ASPIRIN 81 MG: 81 TABLET, COATED ORAL at 05:43

## 2021-10-23 RX ADMIN — LOSARTAN POTASSIUM 25 MG: 25 TABLET, FILM COATED ORAL at 08:25

## 2021-10-23 RX ADMIN — ATORVASTATIN CALCIUM 80 MG: 80 TABLET, FILM COATED ORAL at 05:43

## 2021-10-23 ASSESSMENT — ENCOUNTER SYMPTOMS
BLOOD IN STOOL: 0
ABDOMINAL PAIN: 0
PALPITATIONS: 0
TROUBLE SWALLOWING: 0
DIZZINESS: 0
CHILLS: 0
CONFUSION: 0
AGITATION: 0
COUGH: 0
NUMBNESS: 0
FEVER: 0
DIAPHORESIS: 0
COLOR CHANGE: 0
SHORTNESS OF BREATH: 0
CHEST TIGHTNESS: 0
NERVOUS/ANXIOUS: 0
ABDOMINAL DISTENTION: 0

## 2021-10-23 NOTE — PROGRESS NOTES
Bedside report received from NOC RN. Assumed care of pt. Pt awake, laying in bed. A/Ox4, VSS. No concerns, complaints or distress. Pt educated to call before getting out of bed. POC reviewed and white board updated. Tele box on. SB 58on the monitor. Call light in reach. Bed locked in lowest position with 2 upper bed rails up. Bed alarm on.

## 2021-10-23 NOTE — DISCHARGE SUMMARY
Discharge Summary    CHIEF COMPLAINT ON ADMISSION  Chief Complaint   Patient presents with   • Chest Pain       Reason for Admission  STEMI     Admission Date  10/21/2021    CODE STATUS  Full Code    HPI & HOSPITAL COURSE  This is a 60 y.o. female here with chest pain. Medical history significant for hypertension. Patient was transferred from [facility] with ACS. EKG demonstrated inferior ST elevation with reciprocal T abnormalities. Troponins were 0.1, 0.3, 0.2. Cardiology was consulted. Patient underwent left hear cath and had PCI to distal RCA. Patient will require DAPT x1 year. 2D echo following C demonstrated EF 55-60%, no valvular abnormalities. Patient was determined satisfactory for discharge with appropriate follow up.     Therefore, she is discharged in fair and stable condition to home with close outpatient follow-up.    The patient met 2-midnight criteria for an inpatient stay at the time of discharge.    Discharge Date  10/23/2021    FOLLOW UP ITEMS POST DISCHARGE  Please follow up with PCP in 3-5 days for post hospitalization follow up and medication reconciliation.     Follow up with cardiology in 2 weeeks    DISCHARGE DIAGNOSES  Principal Problem:    NSTEMI (non-ST elevated myocardial infarction) (HCC) POA: Yes  Active Problems:    HTN (hypertension) POA: Yes    HLD (hyperlipidemia) POA: Yes  Resolved Problems:    * No resolved hospital problems. *      FOLLOW UP  Future Appointments   Date Time Provider Department Center   11/19/2021  2:30 PM FLORENTINO Wolf None     No follow-up provider specified.    MEDICATIONS ON DISCHARGE     Medication List      START taking these medications      Instructions   aspirin 81 MG EC tablet  Start taking on: October 24, 2021   Take 1 Tablet by mouth every day.  Dose: 81 mg     atorvastatin 80 MG tablet  Start taking on: October 24, 2021  Commonly known as: LIPITOR   Take 1 Tablet by mouth every day.  Dose: 80 mg     metoprolol SR 25 MG  Tb24  Start taking on: October 24, 2021  Commonly known as: TOPROL XL   Take 1 Tablet by mouth every day for 30 days.  Dose: 25 mg     prasugrel 10 MG Tabs  Start taking on: October 24, 2021  Commonly known as: EFFIENT   Take 1 Tablet by mouth every day.  Dose: 10 mg        CONTINUE taking these medications      Instructions   losartan 25 MG Tabs  Commonly known as: COZAAR   Take 37.5 mg by mouth every day.  Dose: 37.5 mg            Allergies  No Known Allergies    DIET  Orders Placed This Encounter   Procedures   • Diet Order Diet: Cardiac     Standing Status:   Standing     Number of Occurrences:   1     Order Specific Question:   Diet:     Answer:   Cardiac [6]       ACTIVITY  As tolerated.  Weight bearing as tolerated    CONSULTATIONS  Cardiology    PROCEDURES  10/22: left heart cath    LABORATORY  Lab Results   Component Value Date    SODIUM 139 10/23/2021    POTASSIUM 3.8 10/23/2021    CHLORIDE 113 (H) 10/23/2021    CO2 17 (L) 10/23/2021    GLUCOSE 102 (H) 10/23/2021    BUN 10 10/23/2021    CREATININE 0.72 10/23/2021        Lab Results   Component Value Date    WBC 5.1 10/23/2021    HEMOGLOBIN 12.3 10/23/2021    HEMATOCRIT 36.9 (L) 10/23/2021    PLATELETCT 158 (L) 10/23/2021        Total time of the discharge process exceeds 43 minutes.

## 2021-10-23 NOTE — CARE PLAN
The patient is Stable - Low risk of patient condition declining or worsening    Shift Goals  Clinical Goals: monitor R radial cath site  Patient Goals: go home    Progress made toward(s) clinical / shift goals:    Problem: Knowledge Deficit - Standard  Goal: Patient and family/care givers will demonstrate understanding of plan of care, disease process/condition, diagnostic tests and medications  Outcome: Progressing

## 2021-10-23 NOTE — PROGRESS NOTES
"Cardiology Follow Up Progress Note    Date of Service  10/23/2021    Attending Physician  Brooke French D.O.    Chief Complaint   Jaw pain and left shoulder pain with substernal chest pressure     HPI  Ashley Gunter is a 60 y.o. female admitted 10/21/2021 with jaw pain with left shoulder pain leading to substernal chest pressure. Transferred from [facility] with ACS.     Family history significant for her sister dying at age 34 of a \"arrhythmia\".  Mother  age 80 of ovarian cancer.  Father  at age 83 of \"congestive heart failure\".    Interim Events  Patient denies any chest pain, sob, dizziness or palpitations. Heparin gtt     S/p PCI to distal RCA , residual moderate disease in LAD   NSR on the monitor   Right radial cath site is soft, noted hematoma with good peripheral pulse     Review of Systems  Review of Systems   Constitutional: Negative for chills, diaphoresis and fever.   HENT: Negative for nosebleeds and trouble swallowing.    Respiratory: Negative for cough, chest tightness and shortness of breath.    Cardiovascular: Negative for chest pain, palpitations and leg swelling.   Gastrointestinal: Negative for abdominal distention, abdominal pain and blood in stool.   Genitourinary: Negative for hematuria.   Skin: Negative for color change.   Neurological: Negative for dizziness, syncope and numbness.   Psychiatric/Behavioral: Negative for agitation and confusion. The patient is not nervous/anxious.        Vital signs in last 24 hours  Temp:  [35.8 °C (96.5 °F)-37 °C (98.6 °F)] 35.9 °C (96.6 °F)  Pulse:  [59-69] 61  Resp:  [14-16] 15  BP: ()/(46-86) 133/81  SpO2:  [95 %-98 %] 96 %    Physical Exam  Physical Exam  Vitals and nursing note reviewed.   Constitutional:       Appearance: Normal appearance.   HENT:      Head: Normocephalic and atraumatic.   Eyes:      Pupils: Pupils are equal, round, and reactive to light.   Cardiovascular:      Rate and Rhythm: Normal rate and regular rhythm.      " Heart sounds: Normal heart sounds. No murmur heard.     Pulmonary:      Effort: Pulmonary effort is normal.      Breath sounds: Normal breath sounds.   Abdominal:      General: Abdomen is flat.   Musculoskeletal:      Cervical back: Normal range of motion.   Skin:     General: Skin is warm and dry.   Neurological:      General: No focal deficit present.      Mental Status: She is alert and oriented to person, place, and time.   Psychiatric:         Mood and Affect: Mood normal.         Behavior: Behavior normal.         Thought Content: Thought content normal.         Judgment: Judgment normal.         Lab Review  Lab Results   Component Value Date/Time    WBC 5.1 10/23/2021 02:51 AM    RBC 4.04 (L) 10/23/2021 02:51 AM    HEMOGLOBIN 12.3 10/23/2021 02:51 AM    HEMATOCRIT 36.9 (L) 10/23/2021 02:51 AM    MCV 91.3 10/23/2021 02:51 AM    MCH 30.4 10/23/2021 02:51 AM    MCHC 33.3 (L) 10/23/2021 02:51 AM    MPV 10.2 10/23/2021 02:51 AM      Lab Results   Component Value Date/Time    SODIUM 139 10/23/2021 02:51 AM    POTASSIUM 3.8 10/23/2021 02:51 AM    CHLORIDE 113 (H) 10/23/2021 02:51 AM    CO2 17 (L) 10/23/2021 02:51 AM    GLUCOSE 102 (H) 10/23/2021 02:51 AM    BUN 10 10/23/2021 02:51 AM    CREATININE 0.72 10/23/2021 02:51 AM      Lab Results   Component Value Date/Time    ASTSGOT 69 (H) 10/21/2021 09:43 PM    ALTSGPT 28 10/21/2021 09:43 PM     Lab Results   Component Value Date/Time    CHOLSTRLTOT 235 (H) 10/21/2021 09:43 PM     (H) 10/21/2021 09:43 PM    HDL 45 10/21/2021 09:43 PM    TRIGLYCERIDE 177 (H) 10/21/2021 09:43 PM    TROPONINT 839 (H) 10/22/2021 05:35 AM       No results for input(s): NTPROBNP in the last 72 hours.    Cardiac Imaging and Procedures Review  EKG:   Very slight ST elevation limited to lead II only T wave inversion in lead III   no   other signs of acute ischemia normal axis normal intervals   Electronically Signed On 10- 1:45:11 PDT by JEY SALAZAR MD    Echocardiogram:   Pending     Cardiac Catheterization:   10/21/2021  Procedures:  · Insertion of 5/6 FR sheath in the right radial artery  · right and left coronary arteriograms  · Left heart catheterization and Left ventriculogram  · Angioplasty and placement of a 2.5 by 12mm Synergy drug-eluting stent in distal  right coronary artery.     Final diagnosis:   S/p successful PCI of distal RCA.  Residual moderate disease in left anterior descending artery.     Recommendations: If patient has angina, consider stress test as an outpatient to evaluate LAD disease.  Guideline directed medical therapy and risk factor management      Coronary arteriograms:  Left main: normal  Left anterior descending: Diffuse long segment moderate 60 to 70% stenosis in proximal to mid LAD stenosis. Apical LAD free of significant disease. Diagonal branches are small.   Left circumflex: Luminal irregularities without significant disease.  Gives first marginal/ramus in proximal portion with , posterolateral branches distally.    Right coronary: Codominant vessel, diffuse moderate disease (30 to 40%) in proximal to midportion .subtotally occluded in distal portion 99% stenosis, co-dominant     Left Heart Catheterization:  Left Ventriculogram: ejection fraction 55%  Left Ventricular EDP: 16 mm Hg   Aortic Valve Gradient: No significant AV gradient noted      Assessment/Plan  No new Assessment & Plan notes have been filed under this hospital service since the last note was generated.  Service: Cardiology    1. NSTEMI:  - trop trending up 800s  - PCI to distal RCA, residual moderate disease in the LAD. Recurring angina, consider stress test as an outpatient to evaluate LAD disease   - Right radial cath site is soft, noted hematoma with good peripheral pulse   - LVEDP 16mmHg  - continue asa 81mg qd, atorvastatin 80mg qd, prasugrel 10mg qd and metoprolol XL 25mg qd   - ECHO    2. Hypertension:  - added losartan 25mg qd     Future Appointments   Date Time Provider  Department Center   11/19/2021  2:30 PM FLORENTINO Wolf [facility] None           Thank you for allowing me to participate in the care of this patient.  Cardiology will sign off. If ECHO is normal.     Please contact me with any questions.    FLORENTINO Wolf

## 2021-10-23 NOTE — DISCHARGE INSTRUCTIONS
Discharge Instructions    Discharged to home by car with relative. Discharged via wheelchair, hospital escort: Yes.  Special equipment needed: Not Applicable    Be sure to schedule a follow-up appointment with your primary care doctor or any specialists as instructed.     Discharge Plan:   Diet Plan: Discussed  Activity Level: Discussed  Confirmed Follow up Appointment: Patient to Call and Schedule Appointment  Medication Reconciliation Updated: Yes  Influenza Vaccine Indication: Patient Refuses    I understand that a diet low in cholesterol, fat, and sodium is recommended for good health. Unless I have been given specific instructions below for another diet, I accept this instruction as my diet prescription.   Other diet: heart healthy    Special Instructions: Diagnosis:  Acute Coronary Syndrome (ACS) is a diagnosis that encompasses cardiac-related chest pain and heart attack. ACS occurs when the blood flow to the heart muscle is severely reduced or cut off completely due to a slow process called atherosclerosis.  Atherosclerosis is a disease in which the coronary arteries become narrow from a buildup of fat, cholesterol, and other substances that combine to form plaque. If the plaque breaks, a blood clot will form and block the blood flow to the heart muscle. This lack of blood flow can cause damage or death to the heart muscle which is called a heart attack or Myocardial Infarction (MI). There are two different types of MIs:  ST Elevation Myocardial Infarction or STEMI (the most severe type of heart attack) and Non-ST Elevation Myocardial Infarction or NSTEMI.    Treatment Plan:  · Cardiac Diet  - Low fat, low salt, low cholesterol   · Cardiac Rehab  - Your doctor has ordered you a referral to [facility].  Call [phone] to schedule an appointment.  · Attend my follow-up appointment with my Cardiologist.  · Take my medications as prescribed by my doctor  · Exercise daily  · Reduce stress    Medications:  Certain  medications are used to treat ACS.  Remember to always take medications as prescribed and never stop talking medications unless told by your doctor.    You have been prescribed the following medicatons:    Aspirin - Aspirin is used as a blood thinning medication and you will require this medication indefinitely.  Beta-Blocker - Beta-Blocker metoprolol is used to lower blood pressure and heart rate, and/or helps your heart heal after a heart attack.  Statin - Statin atorvastatin is used to lower cholesterol.    · Is patient discharged on Warfarin / Coumadin?   No     Depression / Suicide Risk    As you are discharged from this [facility] facility, it is important to learn how to keep safe from harming yourself.    Recognize the warning signs:  · Abrupt changes in personality, positive or negative- including increase in energy   · Giving away possessions  · Change in eating patterns- significant weight changes-  positive or negative  · Change in sleeping patterns- unable to sleep or sleeping all the time   · Unwillingness or inability to communicate  · Depression  · Unusual sadness, discouragement and loneliness  · Talk of wanting to die  · Neglect of personal appearance   · Rebelliousness- reckless behavior  · Withdrawal from people/activities they love  · Confusion- inability to concentrate     If you or a loved one observes any of these behaviors or has concerns about self-harm, here's what you can do:  · Talk about it- your feelings and reasons for harming yourself  · Remove any means that you might use to hurt yourself (examples: pills, rope, extension cords, firearm)  · Get professional help from the community (Mental Health, Substance Abuse, psychological counseling)  · Do not be alone:Call your Safe Contact- someone whom you trust who will be there for you.  · Call your local CRISIS HOTLINE 182-6555 or 776-724-7734  · Call your local Children's Mobile Crisis Response Team [place] (958) 422-8591 or  www.Splyst  · Call the toll free National Suicide Prevention Hotlines   · National Suicide Prevention Lifeline 212-317-LYUI (1216)  · [employer] Line Network 800-SUICIDE (020-7857)          Non-ST Segment Elevation Heart Attack  A heart attack (myocardial infarction) happens when some of the heart muscle is injured or dies because it does not get enough oxygen. A non-ST segment elevation heart attack is a type of heart attack. It happens when the body does not get enough oxygen because an artery carrying blood to the heart muscles (coronary artery) becomes partly or temporarily blocked. This type of heart attack is usually less severe than the type of heart attack in which a coronary artery becomes completely blocked.  CAUSES  The most common cause of this condition is a blocked coronary artery. A coronary artery can become blocked from a gradual buildup of cholesterol, fat, and plaque. A blood clot can form over the plaque and block blood flow.  RISK FACTORS  This condition is more likely to develop in:  · Smokers.  · Males.  · Older adults.  · Overweight and obese adults.  · People with high blood pressure (hypertension), high cholesterol, or diabetes.  · People with a family history of heart disease.  · People who do not get enough exercise.  · People who are under a lot of stress.  · People who drink too much alcohol.  · People who use illegal street drugs that increase the heart rate, such as cocaine and methamphetamines.  SYMPTOMS  Symptoms of this condition include:  · Chest pain or a feeling of pressure in the chest. It may feel like something is crushing or squeezing the chest.  · Discomfort in the upper back or in the area between the shoulder blades.  · Upper back pain.  · Tingling in the hands and arms.  · Shortness of breath.  · Heartburn or indigestion.  · Sudden cold sweats.  · Unexplained sweating.  · Sudden lightheadedness.  · Unexplained feelings of nervousness or anxiety.  · Feeling of  tiredness, or not feeling well.  DIAGNOSIS  This condition is diagnosed based on a person's signs and symptoms and a physical exam. You may also have tests done, including:  · Blood tests.  · A chest X-ray.  · An test to measure the electrical activity of the heart (electrocardiogram).  · A test that uses sound waves to produce a picture of the heart (echocardiogram).  · A test to look at the heart arteries (coronary angiogram).  If you are still having chest pain after 12-24 hours, or if your health care providers think your heart is at risk, you may have a procedure called cardiac catheterization. In this procedure, a long, thin tube is inserted into an artery in your groin and moved up to the arteries in your heart. This procedure helps your health care provider figure out the source of the problem.   TREATMENT  This condition may be treated with:  · Bed rest in the hospital.  · Medicines to relieve chest pain.  · Medicines to protect the heart.  · If you have a blockage, a procedure in which the artery is opened (angioplasty) and a stent is placed to keep the artery open.  After initial treatment you may need to take medicine to:  · Keep your blood from clotting too easily.  · Control your blood pressure.  · Lower your cholesterol.  · Control abnormal heart rhythms (arrhythmias).  HOME CARE INSTRUCTIONS  · Take medicines only as directed by your health care provider.  · Do not take the following medicines unless your health care provider approves:  ¨ Nonsteroidal anti-inflammatory drugs (NSAIDs), such as ibuprofen, naproxen, or celecoxib.  ¨ Vitamin supplements that contain vitamin A, vitamin E, or both.  ¨ Hormone replacement therapy that contains estrogen with or without progestin.  · Make lifestyle changes as directed by your health care provider. These may include:  ¨ Using no tobacco products, including cigarettes, chewing tobacco, and electronic cigarettes. If you are struggling to quit, ask your health  care provider for help.  ¨ Exercising as directed by your health care provider. Ask for a list of activities that are safe for you.  ¨ Eating a heart-healthy diet. Work with a registered dietitian to learn healthy eating options.  ¨ Maintaining a healthy weight.  ¨ Managing other medical conditions, like diabetes.  ¨ Reducing stress.  ¨ Limiting how much alcohol you drink as directed by your health care provider.  SEEK IMMEDIATE MEDICAL CARE IF:  · You have any symptoms of this condition.  This information is not intended to replace advice given to you by your health care provider. Make sure you discuss any questions you have with your health care provider.  Document Released: 07/17/2006 Document Revised: 03/11/2013 Document Reviewed: 11/25/2015  [employer] Interactive Patient Education © 2017 [employer].      Coronary Angiogram With Stent  Coronary angiogram with stent placement is a procedure to widen or open a narrow blood vessel of the heart (coronary artery). Arteries may become blocked by cholesterol buildup (plaques) in the lining of the wall. When a coronary artery becomes partially blocked, blood flow to that area decreases. This may lead to chest pain or a heart attack (myocardial infarction).  A stent is a small piece of metal that looks like mesh or a spring. Stent placement may be done as treatment for a heart attack or right after a coronary angiogram in which a blocked artery is found.  Let your health care provider know about:  · Any allergies you have.  · All medicines you are taking, including vitamins, herbs, eye drops, creams, and over-the-counter medicines.  · Any problems you or family members have had with anesthetic medicines.  · Any blood disorders you have.  · Any surgeries you have had.  · Any medical conditions you have.  · Whether you are pregnant or may be pregnant.  What are the risks?  Generally, this is a safe procedure. However, problems may occur, including:  · Damage to the heart  or its blood vessels.  · A return of blockage.  · Bleeding, infection, or bruising at the insertion site.  · A collection of blood under the skin (hematoma) at the insertion site.  · A blood clot in another part of the body.  · Kidney injury.  · Allergic reaction to the dye or contrast that is used.  · Bleeding into the abdomen (retroperitoneal bleeding).  What happens before the procedure?  Staying hydrated  Follow instructions from your health care provider about hydration, which may include:  · Up to 2 hours before the procedure - you may continue to drink clear liquids, such as water, clear fruit juice, black coffee, and plain tea.    Eating and drinking restrictions  Follow instructions from your health care provider about eating and drinking, which may include:  · 8 hours before the procedure - stop eating heavy meals or foods such as meat, fried foods, or fatty foods.  · 6 hours before the procedure - stop eating light meals or foods, such as toast or cereal.  · 2 hours before the procedure - stop drinking clear liquids.  Ask your health care provider about:  · Changing or stopping your regular medicines. This is especially important if you are taking diabetes medicines or blood thinners.  · Taking medicines such as ibuprofen. These medicines can thin your blood. Do not take these medicines before your procedure if your health care provider instructs you not to. Generally, aspirin is recommended before a procedure of passing a small, thin tube (catheter) through a blood vessel and into the heart (cardiac catheterization).  What happens during the procedure?    · An IV tube will be inserted into one of your veins.  · You will be given one or more of the following:  ? A medicine to help you relax (sedative).  ? A medicine to numb the area where the catheter will be inserted into an artery (local anesthetic).  · To reduce your risk of infection:  ? Your health care team will wash or sanitize their hands.  ? Your  skin will be washed with soap.  ? Hair may be removed from the area where the catheter will be inserted.  · Using a guide wire, the catheter will be inserted into an artery. The location may be in your groin, in your wrist, or in the fold of your arm (near your elbow).  · A type of X-ray (fluoroscopy) will be used to help guide the catheter to the opening of the arteries in the heart.  · A dye will be injected into the catheter, and X-rays will be taken. The dye will help to show where any narrowing or blockages are located in the arteries.  · A tiny wire will be guided to the blocked spot, and a balloon will be inflated to make the artery wider.  · The stent will be expanded and will crush the plaques into the wall of the vessel. The stent will hold the area open and improve the blood flow. Most stents have a drug coating to reduce the risk of the stent narrowing over time.  · The artery may be made wider using a drill, laser, or other tools to remove plaques.  · When the blood flow is better, the catheter will be removed. The lining of the artery will grow over the stent, which stays where it was placed.  This procedure may vary among health care providers and hospitals.  What happens after the procedure?  · If the procedure is done through the leg, you will be kept in bed lying flat for about 6 hours. You will be instructed to not bend and not cross your legs.  · The insertion site will be checked frequently.  · The pulse in your foot or wrist will be checked frequently.  · You may have additional blood tests, X-rays, and a test that records the electrical activity of your heart (electrocardiogram, or ECG).  This information is not intended to replace advice given to you by your health care provider. Make sure you discuss any questions you have with your health care provider.  Document Released: 06/23/2004 Document Revised: 03/29/2019 Document Reviewed: 07/23/2017  Elsevier Patient Education © 2020 [employer]    Coronary Angiogram With Stent, Care After  This sheet gives you information about how to care for yourself after your procedure. Your health care provider may also give you more specific instructions. If you have problems or questions, contact your health care provider.  What can I expect after the procedure?  After your procedure, it is common to have:  · Bruising in the area where a small, thin tube (catheter) was inserted. This usually fades within 1-2 weeks.  · Blood collecting in the tissue (hematoma) that may be painful to the touch. It should usually decrease in size and tenderness within 1-2 weeks.  Follow these instructions at home:  Insertion area care  · Do not take baths, swim, or use a hot tub until your health care provider approves.  · You may shower 24-48 hours after the procedure or as directed by your health care provider.  · Follow instructions from your health care provider about how to take care of your incision. Make sure you:  ? Wash your hands with soap and water before you change your bandage (dressing). If soap and water are not available, use hand .  ? Change your dressing as told by your health care provider.  ? Leave stitches (sutures), skin glue, or adhesive strips in place. These skin closures may need to stay in place for 2 weeks or longer. If adhesive strip edges start to loosen and curl up, you may trim the loose edges. Do not remove adhesive strips completely unless your health care provider tells you to do that.  · Remove the bandage (dressing) and gently wash the catheter insertion site with plain soap and water.  · Pat the area dry with a clean towel. Do not rub the area, because that may cause bleeding.  · Do not apply powder or lotion to the incision area.  · Check your incision area every day for signs of infection. Check for:  ? More redness, swelling, or pain.  ? More fluid or blood.  ? Warmth.  ? Pus or a bad smell.  Activity  · Do not drive for 24 hours  if you were given a medicine to help you relax (sedative).  · Do not lift anything that is heavier than 10 lb (4.5 kg) for 5 days after your procedure or as directed by your health care provider.  · Ask your health care provider when it is okay for you:  ? To return to work or school.  ? To resume usual physical activities or sports.  ? To resume sexual activity.  Eating and drinking    · Eat a heart-healthy diet. This should include plenty of fresh fruits and vegetables.  · Avoid the following types of food:  ? Food that is high in salt.  ? Canned or highly processed food.  ? Food that is high in saturated fat or sugar.  ? Fried food.  · Limit alcohol intake to no more than 1 drink a day for non-pregnant women and 2 drinks a day for men. One drink equals 12 oz of beer, 5 oz of wine, or 1½ oz of hard liquor.  Lifestyle    · Do not use any products that contain nicotine or tobacco, such as cigarettes and e-cigarettes. If you need help quitting, ask your health care provider.  · Take steps to manage and control your weight.  · Get regular exercise.  · Manage your blood pressure.  · Manage other health problems, such as diabetes.  General instructions  · Take over-the-counter and prescription medicines only as told by your health care provider. Blood thinners may be prescribed after your procedure to improve blood flow through the stent.  · If you need an MRI after your heart stent has been placed, be sure to tell the health care provider who orders the MRI that you have a heart stent.  · Keep all follow-up visits as directed by your health care provider. This is important.  Contact a health care provider if:  · You have a fever.  · You have chills.  · You have increased bleeding from the catheter insertion area. Hold pressure on the area.  Get help right away if:  · You develop chest pain or shortness of breath.  · You feel faint or you pass out.  · You have unusual pain at the catheter insertion area.  · You have  redness, warmth, or swelling at the catheter insertion area.  · You have drainage (other than a small amount of blood on the dressing) from the catheter insertion area.  · The catheter insertion area is bleeding, and the bleeding does not stop after 30 minutes of holding steady pressure on the area.  · You develop bleeding from any other place, such as from your rectum. There may be bright red blood in your urine or stool, or it may appear as black, tarry stool.  This information is not intended to replace advice given to you by your health care provider. Make sure you discuss any questions you have with your health care provider.  Document Released: 07/07/2006 Document Revised: 11/30/2018 Document Reviewed: 09/14/2017  Elsevier Patient Education © 2020 [employer] Inc[employer]

## 2021-10-23 NOTE — PROGRESS NOTES
MD Baker notified of pts hematoma forming at R radial site. Per MD just continue to watch site and see if it grows. If site grows or becomes hard apply pressure to site.

## 2021-10-25 LAB — ACT BLD: 401 SEC (ref 74–137)

## 2021-10-29 ENCOUNTER — TELEPHONE (OUTPATIENT)
Dept: CARDIOLOGY | Facility: MEDICAL CENTER | Age: 61
End: 2021-10-29

## 2021-10-29 NOTE — TELEPHONE ENCOUNTER
BN / RT    Pt called and states that she just had a stent placed on Friday 10/22 and wants to know if she is able to fly? Pt is suppose to go out of town on 11/14. Pt is also having dental work on 11/12 and is wondering if there will be any recommendations. Pt is going to have Dental office send Clearance to x [phone]. Please call pt back to further discuss at [phone].      Pt is scheduled to see RT on 11/19.    Thank you.

## 2021-11-01 ENCOUNTER — TELEPHONE (OUTPATIENT)
Dept: CARDIOLOGY | Facility: MEDICAL CENTER | Age: 61
End: 2021-11-01

## 2021-11-02 NOTE — TELEPHONE ENCOUNTER
Received fax from [facility]  FAX: [phone]  TELE: [phone]       Requesting cardiac clearance for upcoming CLEANING, XRAYS  Scheduled N/A    Currently on Effient 10mg daily  Antibiotic prophylaxis?  No blood thinners on MAR  Use of epinephrine okay?      To RT

## 2021-11-02 NOTE — TELEPHONE ENCOUNTER
Pt called back. Pt is s/p cath with stent on 10/22, consulted by RT in hospital. FV with RT on 11/19.  Pt requesting okay to fly?      To RT for recommendation

## 2021-11-02 NOTE — TELEPHONE ENCOUNTER
Has residual disease. Recommend she follows up prior. Does she have any chest pain?    Thanks,  RT

## 2021-11-03 NOTE — TELEPHONE ENCOUNTER
Called pt [phone]  To relay RT recommendations for flying and dental work.  Per RT it is recommended that pt have FV with RT on 11/19 before any flying and dental work. Pt verbalized understanding and is appreciative of call.

## 2021-11-22 ENCOUNTER — TELEPHONE (OUTPATIENT)
Dept: CARDIOLOGY | Facility: MEDICAL CENTER | Age: 61
End: 2021-11-22

## 2021-11-22 NOTE — TELEPHONE ENCOUNTER
Phoned patient to verify her appointment with Dr. Leal, for Nov. 24, 2021.  Patient stated she would be there for the appointment.

## 2021-11-22 NOTE — TELEPHONE ENCOUNTER
Phoned patient to remind her of her appointment with Dr. Leal.  Patient stated she would be there for her visit.

## 2021-11-24 ENCOUNTER — OFFICE VISIT (OUTPATIENT)
Dept: CARDIOLOGY | Facility: MEDICAL CENTER | Age: 61
End: 2021-11-24
Payer: COMMERCIAL

## 2021-11-24 VITALS
OXYGEN SATURATION: 91 % | DIASTOLIC BLOOD PRESSURE: 72 MMHG | RESPIRATION RATE: 14 BRPM | WEIGHT: 175.4 LBS | BODY MASS INDEX: 29.22 KG/M2 | HEIGHT: 65 IN | SYSTOLIC BLOOD PRESSURE: 100 MMHG | HEART RATE: 74 BPM

## 2021-11-24 DIAGNOSIS — E78.5 DYSLIPIDEMIA: ICD-10-CM

## 2021-11-24 DIAGNOSIS — I10 ESSENTIAL HYPERTENSION: ICD-10-CM

## 2021-11-24 DIAGNOSIS — I21.19 ACUTE MI, INFERIOR WALL (HCC): ICD-10-CM

## 2021-11-24 DIAGNOSIS — I21.4 NSTEMI (NON-ST ELEVATED MYOCARDIAL INFARCTION) (HCC): ICD-10-CM

## 2021-11-24 PROCEDURE — 99214 OFFICE O/P EST MOD 30 MIN: CPT | Performed by: INTERNAL MEDICINE

## 2021-11-24 RX ORDER — METOPROLOL SUCCINATE 25 MG/1
25 TABLET, EXTENDED RELEASE ORAL DAILY
COMMUNITY
End: 2021-12-27 | Stop reason: SDUPTHER

## 2021-11-24 RX ORDER — VALACYCLOVIR HYDROCHLORIDE 500 MG/1
500 TABLET, FILM COATED ORAL PRN
COMMUNITY
Start: 2021-11-11

## 2021-11-24 RX ORDER — AMOXICILLIN AND CLAVULANATE POTASSIUM 875; 125 MG/1; MG/1
1 TABLET, FILM COATED ORAL
COMMUNITY
Start: 2021-11-22 | End: 2021-12-02

## 2021-11-24 RX ORDER — CELECOXIB 100 MG/1
100 CAPSULE ORAL
COMMUNITY
Start: 2021-11-22 | End: 2021-11-24

## 2021-11-24 ASSESSMENT — ENCOUNTER SYMPTOMS
IRREGULAR HEARTBEAT: 0
DIZZINESS: 0
NAUSEA: 0
HEADACHES: 0
SYNCOPE: 0
WHEEZING: 0
SHORTNESS OF BREATH: 0
VOMITING: 0
DIAPHORESIS: 0
WEAKNESS: 0
NUMBNESS: 0
PARESTHESIAS: 0
ABDOMINAL PAIN: 0
DECREASED APPETITE: 0
SLEEP DISTURBANCES DUE TO BREATHING: 0
ORTHOPNEA: 0
PALPITATIONS: 0

## 2021-11-24 ASSESSMENT — FIBROSIS 4 INDEX: FIB4 SCORE: 4.87

## 2021-11-24 NOTE — PROGRESS NOTES
Cardiology Follow-up Consultation Note    Date of note:    11/24/2021    Primary Care Provider: Kassi Alamo M.D.    Name:             Ashley Gunter     YOB: 1960  MRN:               [id number]    Chief Complaint   Patient presents with   • Hypertension   • MI (Non ST Segment Elevation MI)     F/V Dx: NSTEMI (non-ST elevated myocardial infarction) (HCC)   • Hyperlipidemia       HISTORY OF PRESENT ILLNESS  Ms. Ashley Gunter is a 61 y.o. female who returns to see us for follow-up of recent inferior STEMI s/p GENEVIEVE to the RCA, CAD with residual moderate disease in LAD, hypertension and hyperlipidemia.    On 10/21/2021, patient was transferred from [facility] for ST elevation MI.  Underwent coronary angiogram on 10/22/2021 with successful PCI of distal RCA with residual moderate disease in LAD.  Was initiated on dual antiplatelet therapy with aspirin and Effient and was discharged on 10/23/2021.    Patient is new to me.    Interim History:  Since hospital discharge, she started noticing left jaw pain and has a possible dental infection.  Is now on augmentin and celebrex with improvement in symptoms.  Is wondering if she can see a dentist and get her teeth cleaned.    Is tolerating DAPT without any bleeding concerns.    Review of Systems   Constitutional: Negative for decreased appetite, diaphoresis and malaise/fatigue.   Cardiovascular: Negative for chest pain, irregular heartbeat, leg swelling, orthopnea, palpitations and syncope.   Respiratory: Negative for shortness of breath, sleep disturbances due to breathing and wheezing.    Gastrointestinal: Negative for abdominal pain, nausea and vomiting.   Neurological: Negative for dizziness, headaches, numbness, paresthesias and weakness.       Past Medical History:   Diagnosis Date   • Hypertension          History reviewed. No pertinent surgical history.      Current Outpatient Medications   Medication Sig Dispense Refill   • metoprolol SR  (TOPROL XL) 25 MG TABLET SR 24 HR Take 25 mg by mouth every day.     • amoxicillin-clavulanate (AUGMENTIN) 875-125 MG Tab Take 1 Tablet by mouth.     • prasugrel (EFFIENT) 10 MG Tab Take 1 Tablet by mouth every day. 30 Tablet 1   • aspirin EC 81 MG EC tablet Take 1 Tablet by mouth every day. 30 Tablet 1   • atorvastatin (LIPITOR) 80 MG tablet Take 1 Tablet by mouth every day. 30 Tablet 1   • losartan (COZAAR) 25 MG Tab Take 37.5 mg by mouth every day.     • valACYclovir (VALTREX) 500 MG Tab take 1 tablet by mouth once daily for SUPPRESSION AND TWICE DAILY FOR 3 DAYS FOR OUTBREAK       No current facility-administered medications for this visit.         Allergies   Allergen Reactions   • Other Drug      Dextromethorphan-pot Guaiac-gg  Other reaction(s): Other (See Comments)  jittery     • Ace Inhibitors      Other reaction(s): Cough  cough  cough           History reviewed. No pertinent family history.      Social History     Socioeconomic History   • Marital status: Single     Spouse name: Not on file   • Number of children: Not on file   • Years of education: Not on file   • Highest education level: Not on file   Occupational History   • Not on file   Tobacco Use   • Smoking status: Never Smoker   • Smokeless tobacco: Never Used   Substance and Sexual Activity   • Alcohol use: Yes   • Drug use: Never   • Sexual activity: Not on file   Other Topics Concern   • Not on file   Social History Narrative   • Not on file     Social Determinants of Health     Financial Resource Strain:    • Difficulty of Paying Living Expenses: Not on file   Food Insecurity:    • Worried About Running Out of Food in the Last Year: Not on file   • Ran Out of Food in the Last Year: Not on file   Transportation Needs:    • Lack of Transportation (Medical): Not on file   • Lack of Transportation (Non-Medical): Not on file   Physical Activity:    • Days of Exercise per Week: Not on file   • Minutes of Exercise per Session: Not on file   Stress:   "  • Feeling of Stress : Not on file   Social Connections:    • Frequency of Communication with Friends and Family: Not on file   • Frequency of Social Gatherings with Friends and Family: Not on file   • Attends Zoroastrian Services: Not on file   • Active Member of Clubs or Organizations: Not on file   • Attends Club or Organization Meetings: Not on file   • Marital Status: Not on file   Intimate Partner Violence:    • Fear of Current or Ex-Partner: Not on file   • Emotionally Abused: Not on file   • Physically Abused: Not on file   • Sexually Abused: Not on file   Housing Stability:    • Unable to Pay for Housing in the Last Year: Not on file   • Number of Places Lived in the Last Year: Not on file   • Unstable Housing in the Last Year: Not on file         Physical Exam:  Ambulatory Vitals  /72 (BP Location: Left arm, Patient Position: Sitting, BP Cuff Size: Adult)   Pulse 74   Resp 14   Ht 1.645 m (5' 4.75\")   Wt 79.6 kg (175 lb 6.4 oz)   SpO2 91%    Oxygen Therapy:  Pulse Oximetry: 91 % (pt has gel nail polish)  BP Readings from Last 4 Encounters:   11/24/21 100/72   10/23/21 125/70       Weight/BMI: Body mass index is 29.41 kg/m².  Wt Readings from Last 4 Encounters:   11/24/21 79.6 kg (175 lb 6.4 oz)   10/21/21 78 kg (172 lb)       GEN: Well developed, well nourished and in no acute distress.  HEART: no significant JVD, regular rate and rhythm, normal S1 and S2, no murmurs, no third heart sounds, normal cardiac palpation  LUNG: clear to auscultation bilaterally, no wheezing, no crackles, normal respiratory effort on room air  ABDOMEN: soft, non-tender, non-distended, normal bowel sounds throughout  EXTREMITIES: no peripheral edema noted  VASCULAR: no significantly elevated jugular venous pressure, no carotid bruits noted, radial pulses 2+ and equal      Lab Data Review:  Lab Results   Component Value Date/Time    CHOLSTRLTOT 235 (H) 10/21/2021 09:43 PM     (H) 10/21/2021 09:43 PM    HDL 45 " 10/21/2021 09:43 PM    TRIGLYCERIDE 177 (H) 10/21/2021 09:43 PM       Lab Results   Component Value Date/Time    SODIUM 139 10/23/2021 02:51 AM    POTASSIUM 3.8 10/23/2021 02:51 AM    CHLORIDE 113 (H) 10/23/2021 02:51 AM    CO2 17 (L) 10/23/2021 02:51 AM    GLUCOSE 102 (H) 10/23/2021 02:51 AM    BUN 10 10/23/2021 02:51 AM    CREATININE 0.72 10/23/2021 02:51 AM     Lab Results   Component Value Date/Time    ALKPHOSPHAT 57 10/21/2021 09:43 PM    ASTSGOT 69 (H) 10/21/2021 09:43 PM    ALTSGPT 28 10/21/2021 09:43 PM    TBILIRUBIN 0.8 10/21/2021 09:43 PM      Lab Results   Component Value Date/Time    WBC 5.1 10/23/2021 02:51 AM     Lab Results   Component Value Date/Time    HBA1C 5.0 10/22/2021 09:35 AM       Cardiac Imaging and Procedures Review:    EKG dated 10/22/2021: My personal interpretation is sinus rhythm    Echo dated 10/23/2021:   Normal LV function, no WMA    [facility] (10/22/2021):   Coronary arteriograms:  Left main: normal  Left anterior descending: Diffuse long segment moderate 60 to 70% stenosis in proximal to mid LAD stenosis. Apical LAD free of significant disease. Diagonal branches are small.   Left circumflex: Luminal irregularities without significant disease.  Gives first marginal/ramus in proximal portion with , posterolateral branches distally.    Right coronary: Codominant vessel, diffuse moderate disease (30 to 40%) in proximal to midportion .subtotally occluded in distal portion 99% stenosis, co-dominant      Assessment & Plan     1. Acute MI, inferior wall (HCC)  Referral to Cardiac Rehab   2. NSTEMI (non-ST elevated myocardial infarction) (HCC)  Referral to Cardiac Rehab   3. Essential hypertension     4. Dyslipidemia  Lipid Profile       Referral provided to cardiac rehab post RCA NSTEMI.    Patient is tolerating DAPT without any bleeding concerns.  Continue aspirin and Effient for minimum of 1 year.  Aspirin indefinitely after.    In regards to dental clearance, discussed with patient that she  can proceed with dental cleaning as long as the dentist is comfortable with her being on Effient.  Would not recommend discontinuing DAPT at this time to which she voices understanding.    Is currently on Celebrex 100 mg for dental pain.  Discussed that this is a NSAID medication and should be avoided given recent MI.  Patient was unaware.  She will discontinue Celebrex and continue Tylenol as needed.    Lipid panel reviewed.  Obtain lipid panel in 3 months.  Continue Lipitor 80 mg daily.    Blood pressure under well control.  Continue losartan and Toprol-XL 35 mg daily.    For CAD and residual disease in LAD, patient denies any concerning anginal symptoms.  Discussed doing nuclear cardiac stress test if she has concerning cardiovascular symptoms.      All of patient's excellent questions were answered to the best of my knowledge and to her satisfaction.  It was a pleasure seeing Ms. Ashley Gunter in my clinic today. Return in about 6 months (around 5/24/2022). Patient is aware to call the cardiology clinic with any questions or concerns.      Jama Leal MD  [facility]  [facility], [facility]  [address] [address]  Phone: [phone]  Fax: [phone]    Please note that this dictation was created using voice recognition software. I have made every reasonable attempt to correct obvious errors, but it is possible there are errors of grammar and possibly content that I did not discover before finalizing the note.

## 2021-12-08 ENCOUNTER — TELEPHONE (OUTPATIENT)
Dept: CARDIOLOGY | Facility: MEDICAL CENTER | Age: 61
End: 2021-12-08

## 2021-12-08 NOTE — TELEPHONE ENCOUNTER
"Have not received clearance request and none found in media, but per 11/1 telephone encounter, clearance should be sent to [facility].  Per DA note 11/24: \"In regards to dental clearance, discussed with patient that she can proceed with dental cleaning as long as the dentist is comfortable with her being on Effient.  Would not recommend discontinuing DAPT at this time to which she voices understanding.\"  Clearance letter drafted and faxed to [phone]. Receipt confirmed.   "

## 2021-12-08 NOTE — TELEPHONE ENCOUNTER
DA    Pt called, her Dental office is waiting on the med clearance to be returned.     Ashley - [phone]    Thank you,   Veronica AMBRIZ

## 2021-12-08 NOTE — LETTER
PROCEDURE/SURGERY CLEARANCE FORM      Encounter Date: 12/8/2021    Patient: Ashley Gunter  YOB: 1960    CARDIOLOGIST:  Jama Leal MD    REFERRING DOCTOR:  Rena Lowery DDS        The above patient is ok to proceed to have the following procedure/surgery: Dental cleaning and xrays                                           Additional comments: Pt had recent cardiac stenting. Would not recommend stopping Effient or aspirin at this time.         Jama Leal MD    Electronically Signed

## 2021-12-10 ENCOUNTER — RX ONLY (OUTPATIENT)
Age: 61
Setting detail: RX ONLY
End: 2021-12-10

## 2021-12-10 RX ORDER — BETAMETHASONE DIPROPIONATE 0.5 MG/G
CREAM TOPICAL
Qty: 45 | Refills: 1 | Status: ERX

## 2021-12-27 ENCOUNTER — TELEPHONE (OUTPATIENT)
Dept: CARDIOLOGY | Facility: MEDICAL CENTER | Age: 61
End: 2021-12-27

## 2021-12-27 DIAGNOSIS — I21.4 NSTEMI (NON-ST ELEVATED MYOCARDIAL INFARCTION) (HCC): ICD-10-CM

## 2021-12-27 RX ORDER — ATORVASTATIN CALCIUM 80 MG/1
80 TABLET, FILM COATED ORAL DAILY
Qty: 90 TABLET | Refills: 3 | Status: SHIPPED | OUTPATIENT
Start: 2021-12-27 | End: 2022-08-10 | Stop reason: SDUPTHER

## 2021-12-27 RX ORDER — PRASUGREL 10 MG/1
10 TABLET, FILM COATED ORAL DAILY
Qty: 90 TABLET | Refills: 3 | Status: SHIPPED | OUTPATIENT
Start: 2021-12-27 | End: 2022-08-09 | Stop reason: SDUPTHER

## 2021-12-27 RX ORDER — METOPROLOL SUCCINATE 25 MG/1
25 TABLET, EXTENDED RELEASE ORAL DAILY
Qty: 90 TABLET | Refills: 3 | Status: SHIPPED | OUTPATIENT
Start: 2021-12-27 | End: 2022-08-09 | Stop reason: SDUPTHER

## 2021-12-27 NOTE — TELEPHONE ENCOUNTER
Called pt [phone]  Advised pt that all 3 refills sent to [facility].  Pt verbalized understanding and will call back if pharmacy did not receive refills.

## 2021-12-27 NOTE — TELEPHONE ENCOUNTER
DA    Patient called and has been out of her meds for 3 days and needs her Metoprolol, Atorvastatin, Prasugrel. Please send to pharmacy ASAP as they close at 2:30 pm. She also wants to  Know if there is a risk since she has been out of her meds for 3 days now. Please advise.    Thank you,    Lolis GILLEPSIE

## 2022-01-12 ENCOUNTER — APPOINTMENT (RX ONLY)
Dept: URBAN - NONMETROPOLITAN AREA CLINIC 1 | Facility: CLINIC | Age: 62
Setting detail: DERMATOLOGY
End: 2022-01-12

## 2022-01-12 DIAGNOSIS — L40.0 PSORIASIS VULGARIS: ICD-10-CM | Status: WELL CONTROLLED

## 2022-01-12 DIAGNOSIS — Z12.83 ENCOUNTER FOR SCREENING FOR MALIGNANT NEOPLASM OF SKIN: ICD-10-CM

## 2022-01-12 DIAGNOSIS — L72.0 EPIDERMAL CYST: ICD-10-CM

## 2022-01-12 PROCEDURE — ? COUNSELING

## 2022-01-12 PROCEDURE — 99212 OFFICE O/P EST SF 10 MIN: CPT

## 2022-01-12 ASSESSMENT — LOCATION DETAILED DESCRIPTION DERM
LOCATION DETAILED: SUPERIOR LUMBAR SPINE
LOCATION DETAILED: RIGHT SUPERIOR MEDIAL UPPER BACK
LOCATION DETAILED: LEFT CENTRAL TEMPLE

## 2022-01-12 ASSESSMENT — LOCATION SIMPLE DESCRIPTION DERM
LOCATION SIMPLE: LEFT TEMPLE
LOCATION SIMPLE: LOWER BACK
LOCATION SIMPLE: RIGHT UPPER BACK

## 2022-01-12 ASSESSMENT — LOCATION ZONE DERM
LOCATION ZONE: FACE
LOCATION ZONE: TRUNK

## 2022-01-12 NOTE — PROCEDURE: MIPS QUALITY
Detail Level: Generalized
Quality 130: Documentation Of Current Medications In The Medical Record: Current Medications Documented
Quality 226: Preventive Care And Screening: Tobacco Use: Screening And Cessation Intervention: Patient screened for tobacco use and is an ex/non-smoker
Quality 402: Tobacco Use And Help With Quitting Among Adolescents: Patient screened for tobacco and never smoked

## 2022-02-04 ENCOUNTER — TELEPHONE (OUTPATIENT)
Dept: CARDIOLOGY | Facility: MEDICAL CENTER | Age: 62
End: 2022-02-04

## 2022-02-04 NOTE — TELEPHONE ENCOUNTER
DA    Patient called to advise they do not have the order for labs sent to [facility].     Thank you,  Paty QUIÑONES

## 2022-02-04 NOTE — TELEPHONE ENCOUNTER
Lab orders faxed to [facility] at [phone].     Copy of orders mailed to pt.     Left voice message with pt to notify pt.

## 2022-02-17 ENCOUNTER — APPOINTMENT (RX ONLY)
Dept: URBAN - NONMETROPOLITAN AREA CLINIC 1 | Facility: CLINIC | Age: 62
Setting detail: DERMATOLOGY
End: 2022-02-17

## 2022-02-17 DIAGNOSIS — Z41.9 ENCOUNTER FOR PROCEDURE FOR PURPOSES OTHER THAN REMEDYING HEALTH STATE, UNSPECIFIED: ICD-10-CM

## 2022-02-17 PROCEDURE — ? ADDITIONAL NOTES

## 2022-02-17 PROCEDURE — ? BOTOX

## 2022-02-17 NOTE — PROCEDURE: BOTOX
Additional Area 6 Units: 0
Post-Care Instructions: Patient instructed to not lie down for 4 hours and limit physical activity for 24 hours. RN recommends topical arnica and/or ice if bruising presents.\\n\\nPt instructed to contact the clinic with any questions, concerns, or post treatment complications, ie: the pt feels the product did not work for them, etc.  \\n\\Henok pt concerns and questions addressed and pt verbalized understanding.
Consent: Written consent reviewed by RN and signed by pt. Risks include but not limited to lid/brow ptosis, bruising, swelling, diplopia, temporary effect, incomplete chemical denervation.  \\nPt denies pregnancy. \\nIf pt is breastfeeding, they are counseled to dispose of breast milk for 24 hours post botox injection. \\nPt denies current administration of anti-biotics. \\nPt denies allergy to albumin or lactose. \\nPt's taking blood thinners are counseled on the increased risk of bleeding and bruising at the injection site.
Lot #: [id number]
Price (Use Numbers Only, No Special Characters Or $): 233
Depressor Anguli Oris Units: 8
Detail Level: Detailed
Dilution (U/0.1 Cc): 4
Glabellar Complex Units: 20

## 2022-04-11 ENCOUNTER — PATIENT MESSAGE (OUTPATIENT)
Dept: CARDIOLOGY | Facility: MEDICAL CENTER | Age: 62
End: 2022-04-11
Payer: COMMERCIAL

## 2022-04-14 NOTE — TELEPHONE ENCOUNTER
Okay to write a letter that states that patient had a heart attack in October 2021 and is on blood thinner which puts her at a high risk of bleeding.  She is to avoid sports that has risk of falling and injuring herself.  Thanks

## 2022-04-18 ENCOUNTER — RX ONLY (OUTPATIENT)
Age: 62
Setting detail: RX ONLY
End: 2022-04-18

## 2022-04-18 RX ORDER — BETAMETHASONE DIPROPIONATE 0.5 MG/ML
LOTION TOPICAL
Qty: 60 | Refills: 6 | Status: ERX

## 2022-04-18 RX ORDER — BETAMETHASONE DIPROPIONATE 0.5 MG/G
OINTMENT TOPICAL
Qty: 45 | Refills: 6 | Status: ERX | COMMUNITY
Start: 2022-04-18

## 2022-04-18 RX ORDER — BETAMETHASONE DIPROPIONATE 0.5 MG/G
CREAM TOPICAL
Qty: 45 | Refills: 6 | Status: ERX | COMMUNITY
Start: 2022-04-18

## 2022-04-19 NOTE — PATIENT COMMUNICATION
Called pt [phone]  Pt request letter emailed to: [email or web address]      Scan signed letter to urszula

## 2022-05-23 ENCOUNTER — TELEPHONE (OUTPATIENT)
Dept: CARDIOLOGY | Facility: MEDICAL CENTER | Age: 62
End: 2022-05-23
Payer: COMMERCIAL

## 2022-05-23 NOTE — TELEPHONE ENCOUNTER
----- Message from Chelsey Bernal sent at 2022 10:21 AM PDT -----  Regarding: [employer] Message From PT  AW  TO: Dr To  NM: Ashley Gunter    PH: [phone]   PT NM: Ashley Gunter   : 64   REG DR: Dr Leal   RE: Had urgent questions/concerns  about an allergy medication she took  and the interactions it could have  with her high blood pressure.   DISP HIST: 2022 05:04P RH P/disp  2022 05:07P 1TO Txt To cell

## 2022-06-16 ENCOUNTER — APPOINTMENT (RX ONLY)
Dept: URBAN - NONMETROPOLITAN AREA CLINIC 1 | Facility: CLINIC | Age: 62
Setting detail: DERMATOLOGY
End: 2022-06-16

## 2022-06-17 ENCOUNTER — APPOINTMENT (RX ONLY)
Dept: URBAN - NONMETROPOLITAN AREA CLINIC 1 | Facility: CLINIC | Age: 62
Setting detail: DERMATOLOGY
End: 2022-06-17

## 2022-06-17 DIAGNOSIS — Z41.9 ENCOUNTER FOR PROCEDURE FOR PURPOSES OTHER THAN REMEDYING HEALTH STATE, UNSPECIFIED: ICD-10-CM

## 2022-06-17 PROCEDURE — ? BOTOX

## 2022-06-17 PROCEDURE — ? ADDITIONAL NOTES

## 2022-06-17 NOTE — PROCEDURE: BOTOX
Show Topical Anesthesia: Yes
[facility] Units: 0
Comments: Pt asked to animate muscles in the treatment area and musculature was palpated for proper injection placement.
Depressor Anguli Oris Units: 8
Price (Use Numbers Only, No Special Characters Or $): 399
Glabellar Complex Units: 20
Lot #: [id number]
Consent: Written consent reviewed by RN and signed by pt. Risks include but not limited to lid/brow ptosis, bruising, swelling, diplopia, temporary effect, incomplete chemical denervation.  \\nPt denies pregnancy.  \\nIf pt is breastfeeding, they are counseled to dispose of breast milk for 24 hours post botox injection. \\nPt denies current administration of anti-biotics. \\nPt denies allergy to albumin or lactose. \\nPt's taking blood thinners are counseled on the increased risk of bleeding and bruising at the injection site.
Post-Care Instructions: Patient instructed to not lie down for 4 hours and limit physical activity for 24 hours. RN recommends topical arnica and/or ice if bruising presents.\\n\\nPt instructed to contact the clinic with any questions, concerns, or post treatment complications.\\n\\Henok pt concerns and questions addressed and pt verbalized understanding.
Dilution (U/0.1 Cc): 4
Detail Level: Detailed

## 2022-06-17 NOTE — PROCEDURE: ADDITIONAL NOTES
Additional Notes: I counseled the patient regarding the following:\\n\\nRN discussed the risks and benefits of botox including but not limited to bruising, muscle weakness, lid or brow ptosis, double vision, facial weakness, headaches, procedural pain, temporary benefit only.\\n\\nIt is impossible to know the exact location of the vasculature in the treatment area. If a vein or artery is compromised by the injection, pt may experience an increase in bruising as a result. This is temporary and usually resolves in a few days to a few weeks. \\n\\nPt understands botox is a neuro-modulator that is injected into the muscle, which relaxes the muscle movement. \\n\\nPatient understands that treatment with botox only lessens dynamic wrinkles on a temporary basis, usually for 2-3 months. \\n\\nPt is aware of the variability in patient response, including a lack of response to treatment, and that no guarantee of length of benefit can be made. \\n\\nPt taking blood thinners prior to treatment may experience an increased risk for bruising and bleeding at the injection site. \\n\\nPatient should not lie down for 4 hours after injections nor exercise for 24 hours. \\n\\nIf bruising presents, pt may utilize oral or topical arnica and ice for treatment. \\n\\nPt instructed to contact the clinic with any questions, concerns, or post treatment complications.\\n\\nPatient understands that the treatment is cosmetic in nature and not covered by insurance.\\n\\Henok pt concerns and questions addressed and pt verbalized understanding.
Detail Level: Simple
Render Risk Assessment In Note?: yes

## 2022-07-01 ENCOUNTER — TELEPHONE (OUTPATIENT)
Dept: CARDIOLOGY | Facility: MEDICAL CENTER | Age: 62
End: 2022-07-01
Payer: COMMERCIAL

## 2022-07-01 NOTE — TELEPHONE ENCOUNTER
BLANCA    Caller: Ashley Gunter    Topic/issue: LAB ORDERS    Patient would like if these orders could be faxed over to [facility]. Please advise.    Thank you,  Timo LYN    Callback Number: [phone] (home) [phone] (work)

## 2022-07-01 NOTE — TELEPHONE ENCOUNTER
Lab order printed and faxed to [phone]  Confirmation status received  Scan to [employer]    -------------------------------  Sent pt my chart message

## 2022-07-05 ENCOUNTER — TELEPHONE (OUTPATIENT)
Dept: CARDIOLOGY | Facility: MEDICAL CENTER | Age: 62
End: 2022-07-05
Payer: COMMERCIAL

## 2022-07-05 NOTE — TELEPHONE ENCOUNTER
Lab order printed and faxed to number provided.   Received fax confirmation, scanned into [employer].     Called patient mobile number and advised I just faxed over lab order, she confirmed they got it. Answered all questions and concerns, appreciative of call.

## 2022-07-05 NOTE — TELEPHONE ENCOUNTER
BLANCA     Caller: Ashley Gunter     Topic/issue: LAB ORDERS     Patient is at [facility], they still do not have lab orders.  Can they please be faxed over ASAP?  Fax - [phone]     Thank you,  Veronica HAMMONDS

## 2022-07-06 DIAGNOSIS — E78.5 DYSLIPIDEMIA: ICD-10-CM

## 2022-07-07 ENCOUNTER — OFFICE VISIT (OUTPATIENT)
Dept: CARDIOLOGY | Facility: MEDICAL CENTER | Age: 62
End: 2022-07-07
Payer: COMMERCIAL

## 2022-07-07 VITALS
WEIGHT: 171.6 LBS | SYSTOLIC BLOOD PRESSURE: 114 MMHG | DIASTOLIC BLOOD PRESSURE: 80 MMHG | RESPIRATION RATE: 16 BRPM | OXYGEN SATURATION: 98 % | BODY MASS INDEX: 28.59 KG/M2 | HEART RATE: 70 BPM | HEIGHT: 65 IN

## 2022-07-07 DIAGNOSIS — E78.2 MIXED HYPERCHOLESTEROLEMIA AND HYPERTRIGLYCERIDEMIA: ICD-10-CM

## 2022-07-07 DIAGNOSIS — I10 ESSENTIAL HYPERTENSION: ICD-10-CM

## 2022-07-07 DIAGNOSIS — I21.19 ACUTE MI, INFERIOR WALL (HCC): ICD-10-CM

## 2022-07-07 DIAGNOSIS — I21.4 NSTEMI (NON-ST ELEVATED MYOCARDIAL INFARCTION) (HCC): ICD-10-CM

## 2022-07-07 PROCEDURE — 99214 OFFICE O/P EST MOD 30 MIN: CPT | Performed by: INTERNAL MEDICINE

## 2022-07-07 RX ORDER — BETAMETHASONE DIPROPIONATE 0.5 MG/G
LOTION TOPICAL 2 TIMES DAILY PRN
COMMUNITY
Start: 2022-04-26

## 2022-07-07 RX ORDER — EZETIMIBE 10 MG/1
10 TABLET ORAL DAILY
Qty: 90 TABLET | Refills: 3 | Status: SHIPPED | OUTPATIENT
Start: 2022-07-07 | End: 2022-08-10 | Stop reason: SDUPTHER

## 2022-07-07 ASSESSMENT — FIBROSIS 4 INDEX: FIB4 SCORE: 4.87

## 2022-07-07 NOTE — PROGRESS NOTES
Cardiology Follow-up Consultation Note    Date of note:    7/7/2022    Primary Care Provider: Kassi Alamo M.D.    Name:             Ashley Gunter     YOB: 1960  MRN:               [id number]    Chief Complaint   Patient presents with   • MI (Non ST Segment Elevation MI)   • Hyperlipidemia   • Hypertension       HISTORY OF PRESENT ILLNESS  Ms. Ashley Gunter is a 61 y.o. female who returns to see us for follow-up of recent inferior STEMI s/p GENEVIEVE to the RCA, CAD with residual moderate disease in LAD, hypertension and hyperlipidemia.  Is accompanied by her partner, Regino.    On 10/21/2021, patient was transferred from [facility] for ST elevation MI.  Underwent coronary angiogram on 10/22/2021 with successful PCI of distal RCA with residual moderate disease in LAD.  Was initiated on dual antiplatelet therapy with aspirin and Effient and was discharged on 10/23/2021.    Last Clinic Visit: 11/24/2021    Interim History:  Since her last visit, has been doing well.  Has several questions regarding medications and if she can discontinue any.  Is wondering if she needs to continue Effient due to frequent bruising and easy skin tearing.  Has psoriasis and occasionally uses steroid cream.    Needs to get teeth cleaned.    Got recent labs with much improved LDL.      Past Medical History:   Diagnosis Date   • Hypertension          History reviewed. No pertinent surgical history.      Current Outpatient Medications   Medication Sig Dispense Refill   • betamethasone dipropionate 0.05 % lotion Apply  topically 2 times a day as needed.     • ezetimibe (ZETIA) 10 MG Tab Take 1 Tablet by mouth every day. 90 Tablet 3   • atorvastatin (LIPITOR) 80 MG tablet Take 1 Tablet by mouth every day. 90 Tablet 3   • prasugrel (EFFIENT) 10 MG Tab Take 1 Tablet by mouth every day. 90 Tablet 3   • metoprolol SR (TOPROL XL) 25 MG TABLET SR 24 HR Take 1 Tablet by mouth every day. 90 Tablet 3   • valACYclovir (VALTREX)  "500 MG Tab Take 500 mg by mouth as needed.     • aspirin EC 81 MG EC tablet Take 1 Tablet by mouth every day. 30 Tablet 1     No current facility-administered medications for this visit.         Allergies   Allergen Reactions   • Other Drug      Dextromethorphan-pot Guaiac-gg  Other reaction(s): Other (See Comments)  jittery     • Ace Inhibitors      Other reaction(s): Cough  cough  cough         History reviewed. No pertinent family history.      Social History     Socioeconomic History   • Marital status: Single     Spouse name: Not on file   • Number of children: Not on file   • Years of education: Not on file   • Highest education level: Not on file   Occupational History   • Not on file   Tobacco Use   • Smoking status: Never Smoker   • Smokeless tobacco: Never Used   Substance and Sexual Activity   • Alcohol use: Yes   • Drug use: Yes     Types: Marijuana, Inhaled, Oral     Comment: Occ   • Sexual activity: Not on file   Other Topics Concern   • Not on file   Social History Narrative   • Not on file     Social Determinants of Health     Financial Resource Strain: Not on file   Food Insecurity: Not on file   Transportation Needs: Not on file   Physical Activity: Not on file   Stress: Not on file   Social Connections: Not on file   Intimate Partner Violence: Not on file   Housing Stability: Not on file       Physical Exam:  Ambulatory Vitals  /80 (BP Location: Left arm, Patient Position: Sitting, BP Cuff Size: Adult)   Pulse 70   Resp 16   Ht 1.651 m (5' 5\")   Wt 77.8 kg (171 lb 9.6 oz)   SpO2 98%    Oxygen Therapy:  Pulse Oximetry: 98 %  BP Readings from Last 4 Encounters:   07/07/22 114/80   11/24/21 100/72   10/23/21 125/70       Weight/BMI: Body mass index is 28.56 kg/m².  Wt Readings from Last 4 Encounters:   07/07/22 77.8 kg (171 lb 9.6 oz)   11/24/21 79.6 kg (175 lb 6.4 oz)   10/21/21 78 kg (172 lb)       GEN: Well developed, well nourished and in no acute distress.  HEART: no significant JVD, " regular rate and rhythm, normal S1 and S2, no murmurs, no third heart sounds, normal cardiac palpation  LUNG: clear to auscultation bilaterally, no wheezing, no crackles, normal respiratory effort on room air  ABDOMEN: soft, non-tender, non-distended, normal bowel sounds throughout  EXTREMITIES: no peripheral edema noted  VASCULAR: no significantly elevated jugular venous pressure, no carotid bruits noted, radial pulses 2+ and equal      Lab Data Review:  Lab Results   Component Value Date/Time    CHOLSTRLTOT 235 (H) 10/21/2021 09:43 PM     (H) 10/21/2021 09:43 PM    HDL 45 10/21/2021 09:43 PM    TRIGLYCERIDE 177 (H) 10/21/2021 09:43 PM       Lab Results   Component Value Date/Time    SODIUM 139 10/23/2021 02:51 AM    POTASSIUM 3.8 10/23/2021 02:51 AM    CHLORIDE 113 (H) 10/23/2021 02:51 AM    CO2 17 (L) 10/23/2021 02:51 AM    GLUCOSE 102 (H) 10/23/2021 02:51 AM    BUN 10 10/23/2021 02:51 AM    CREATININE 0.72 10/23/2021 02:51 AM     Lab Results   Component Value Date/Time    ALKPHOSPHAT 57 10/21/2021 09:43 PM    ASTSGOT 69 (H) 10/21/2021 09:43 PM    ALTSGPT 28 10/21/2021 09:43 PM    TBILIRUBIN 0.8 10/21/2021 09:43 PM      Lab Results   Component Value Date/Time    WBC 5.1 10/23/2021 02:51 AM     Lab Results   Component Value Date/Time    HBA1C 5.0 10/22/2021 09:35 AM       Cardiac Imaging and Procedures Review:    ECG: ECG performed 10/22/2021 was interpreted by me which shows sinus rhythm    Echo: Echocardiogram performed on 10/23/2021 was personally interpreted by me which shows normal left ventricular size and function with EF 65%, no wall motion abnormality    LHC (10/22/2021):   Coronary arteriograms:  Left main: normal  Left anterior descending: Diffuse long segment moderate 60 to 70% stenosis in proximal to mid LAD stenosis. Apical LAD free of significant disease. Diagonal branches are small.   Left circumflex: Luminal irregularities without significant disease.  Gives first marginal/ramus in proximal  portion with , posterolateral branches distally.    Right coronary: Codominant vessel, diffuse moderate disease (30 to 40%) in proximal to midportion .subtotally occluded in distal portion 99% stenosis, co-dominant      Assessment & Plan     1. NSTEMI (non-ST elevated myocardial infarction) (HCC)  ezetimibe (ZETIA) 10 MG Tab    Referral to Nutrition Services   2. Acute MI, inferior wall (HCC)     3. Essential hypertension     4. Mixed hypercholesterolemia and hypertriglyceridemia  ezetimibe (ZETIA) 10 MG Tab    Referral to Nutrition Services    Lipid Profile       Patient completed cardiac rehab last year.  Has restarted mountain biking and feels well without any anginal symptoms.    Is currently on DAPT with aspirin and Effient with significant bruising and easy bleeding.  Discussed continuing Effient until 10/22/2022 which will be 1 year since her heart attack.  Can discontinue Effient afterwards.  Aspirin indefinitely after.    Okay to proceed with teeth cleaning 1 week after Effient has been discontinued.    Repeat lipid panel reviewed which shows , , HDL 40, LDL 87.  Discussed that LDL and triglycerides are still above goal.  Continue atorvastatin 80 mg daily.  Add Zetia 10 mg daily.  Goal LDL less than 70.    For elevated triglycerides, discussed lifestyle modification with low-carbohydrate intake along with weight loss.  Referral provided to nutrition services.  Repeat lipid panel in 3 months to monitor.    Blood pressure well controlled.  Continue Toprol-XL 25 mg daily and discontinue losartan for now.  Advised to monitor BP at home with goal less than 130/80.  We also discussed low-salt diet.    For CAD and residual disease in LAD, patient denies any concerning anginal symptoms.  We will do nuclear cardiac stress test if she has concerning cardiovascular symptoms.      All of patient and her partner's excellent questions were answered to the best of my knowledge and to their satisfaction.  It  was a pleasure seeing Ms. Ashley Gunter in my clinic today. Return in about 1 year (around 7/7/2023). Patient is aware to call the cardiology clinic with any questions or concerns.      Jama Leal MD  [facility]  [facility]  [address], [place] [address]  [place] [address]  Phone: [phone]  Fax: [phone]    Please note that this dictation was created using voice recognition software. I have made every reasonable attempt to correct obvious errors, but it is possible there are errors of grammar and possibly content that I did not discover before finalizing the note.

## 2022-08-09 DIAGNOSIS — I10 ESSENTIAL HYPERTENSION: Primary | ICD-10-CM

## 2022-08-09 DIAGNOSIS — I21.4 NSTEMI (NON-ST ELEVATED MYOCARDIAL INFARCTION) (HCC): ICD-10-CM

## 2022-08-09 RX ORDER — LOSARTAN POTASSIUM 25 MG/1
25 TABLET ORAL DAILY
COMMUNITY
End: 2022-08-11

## 2022-08-09 NOTE — TELEPHONE ENCOUNTER
PRASUGREL    Is the patient due for a refill? Yes    Was the patient seen the past year? Yes    Date of last office visit: 07/07/22    Does the patient have an upcoming appointment?  No       Provider to refill:DA    Does the patients insurance require a 100 day supply?  No

## 2022-08-09 NOTE — TELEPHONE ENCOUNTER
METOPROLOL    Is the patient due for a refill? Yes    Was the patient seen the past year? Yes    Date of last office visit: 07/07/22    Does the patient have an upcoming appointment?  No       Provider to refill:DA    Does the patients insurance require a 100 day supply?  No

## 2022-08-10 DIAGNOSIS — E78.2 MIXED HYPERCHOLESTEROLEMIA AND HYPERTRIGLYCERIDEMIA: ICD-10-CM

## 2022-08-10 DIAGNOSIS — I21.4 NSTEMI (NON-ST ELEVATED MYOCARDIAL INFARCTION) (HCC): ICD-10-CM

## 2022-08-10 RX ORDER — METOPROLOL SUCCINATE 25 MG/1
25 TABLET, EXTENDED RELEASE ORAL DAILY
Qty: 90 TABLET | Refills: 3 | Status: SHIPPED | OUTPATIENT
Start: 2022-08-10 | End: 2022-09-19 | Stop reason: SDUPTHER

## 2022-08-10 RX ORDER — PRASUGREL 10 MG/1
10 TABLET, FILM COATED ORAL DAILY
Qty: 90 TABLET | Refills: 3 | Status: SHIPPED | OUTPATIENT
Start: 2022-08-10 | End: 2022-09-20 | Stop reason: SDUPTHER

## 2022-08-10 NOTE — TELEPHONE ENCOUNTER
Is the patient due for a refill? Yes    Was the patient seen the past year? Yes    Date of last office visit: 7/7/22    Does the patient have an upcoming appointment?  No    Provider to refill:DA    Does the patients insurance require a 100 day supply?  No    To be sent to Mail order.

## 2022-08-11 ENCOUNTER — TELEPHONE (OUTPATIENT)
Dept: CARDIOLOGY | Facility: MEDICAL CENTER | Age: 62
End: 2022-08-11
Payer: COMMERCIAL

## 2022-08-11 ENCOUNTER — PATIENT MESSAGE (OUTPATIENT)
Dept: CARDIOLOGY | Facility: MEDICAL CENTER | Age: 62
End: 2022-08-11
Payer: COMMERCIAL

## 2022-08-11 RX ORDER — EZETIMIBE 10 MG/1
10 TABLET ORAL DAILY
Qty: 90 TABLET | Refills: 3 | Status: SHIPPED | OUTPATIENT
Start: 2022-08-11 | End: 2023-04-20 | Stop reason: SDUPTHER

## 2022-08-11 RX ORDER — LOSARTAN POTASSIUM 25 MG/1
25 TABLET ORAL DAILY
Qty: 90 TABLET | Refills: 3 | OUTPATIENT
Start: 2022-08-11

## 2022-08-11 RX ORDER — ATORVASTATIN CALCIUM 80 MG/1
80 TABLET, FILM COATED ORAL DAILY
Qty: 90 TABLET | Refills: 3 | Status: SHIPPED | OUTPATIENT
Start: 2022-08-11 | End: 2023-06-13 | Stop reason: SDUPTHER

## 2022-08-11 NOTE — TELEPHONE ENCOUNTER
----- Message from Leonardo Pruett R.N. sent at 8/11/2022  9:01 AM PDT -----  To RN for BLANCA- please contact patient to obtain BP readings for Losartan RX. -refer to RX request forwarded  Thanks    -------------------------------------------------------------------------------------  ----- Message -----  From: Jama Leal M.D.  Sent: 8/11/2022   8:33 AM PDT  To: Leonardo Pruett R.N.    If BP is well controlled, no need to send a prescription for losartan.  Thanks.

## 2022-08-11 NOTE — TELEPHONE ENCOUNTER
From Last OV with DA 7/7/22  Blood pressure well controlled.  Continue Toprol-XL 25 mg daily and discontinue losartan for now.  Advised to monitor BP at home with goal less than 130/80.  We also discussed low-salt diet.    Repeat lipid panel reviewed which shows , , HDL 40, LDL 87.  Discussed that LDL and triglycerides are still above goal.  Continue atorvastatin 80 mg daily.  Add Zetia 10 mg daily.      Ator and Zetia approved    To DA to clarify Losartan

## 2022-08-24 ENCOUNTER — TELEPHONE (OUTPATIENT)
Dept: CARDIOLOGY | Facility: MEDICAL CENTER | Age: 62
End: 2022-08-24
Payer: COMMERCIAL

## 2022-08-24 VITALS — SYSTOLIC BLOOD PRESSURE: 123 MMHG | DIASTOLIC BLOOD PRESSURE: 68 MMHG

## 2022-08-24 NOTE — PATIENT COMMUNICATION
1-2 hr post-meds     Th 8/11   125/80           Fr       8/12   126/74                     Sa      8/13                         [place]      8/14   123/80            Mo     8/15   121/75           Tu      8/16                         We     8/17 Th 8/18   117/76         & after 45 min ride  Mo     8/22   123/68  -----------------------------------  Readings sent via my chart  Entered into flow sheet

## 2022-08-25 NOTE — TELEPHONE ENCOUNTER
"\"So when I randomly started checking my BP again, it was usually 130s/high 80s, so I started taking the Losartan again. I will keep stop taking it and faithfully record my BP and send to you next week.\"    This is what her message said.  It is a bit confusing.  Can you please confirm?  "

## 2022-08-29 ENCOUNTER — RX ONLY (OUTPATIENT)
Age: 62
Setting detail: RX ONLY
End: 2022-08-29

## 2022-08-29 RX ORDER — BETAMETHASONE DIPROPIONATE 0.5 MG/ML
LOTION TOPICAL
Qty: 180 | Refills: 4 | Status: ERX

## 2022-09-09 ENCOUNTER — PATIENT MESSAGE (OUTPATIENT)
Dept: CARDIOLOGY | Facility: MEDICAL CENTER | Age: 62
End: 2022-09-09
Payer: COMMERCIAL

## 2022-09-12 ENCOUNTER — PATIENT MESSAGE (OUTPATIENT)
Dept: CARDIOLOGY | Facility: MEDICAL CENTER | Age: 62
End: 2022-09-12
Payer: COMMERCIAL

## 2022-09-19 DIAGNOSIS — I10 ESSENTIAL HYPERTENSION: ICD-10-CM

## 2022-09-19 NOTE — TELEPHONE ENCOUNTER
Is the patient due for a refill? Yes    Was the patient seen the past year? No    Date of last office visit: 07/07/22    Does the patient have an upcoming appointment?  No      Provider to refill:Dr. Leal     Does the patients insurance require a 100 day supply?  No      Possible duplicate Paper Rx for [facility]

## 2022-09-20 DIAGNOSIS — I21.4 NSTEMI (NON-ST ELEVATED MYOCARDIAL INFARCTION) (HCC): ICD-10-CM

## 2022-09-20 RX ORDER — PRASUGREL 10 MG/1
10 TABLET, FILM COATED ORAL DAILY
Qty: 90 TABLET | Refills: 3 | Status: SHIPPED | OUTPATIENT
Start: 2022-09-20 | End: 2023-08-15

## 2022-09-20 RX ORDER — METOPROLOL SUCCINATE 25 MG/1
25 TABLET, EXTENDED RELEASE ORAL DAILY
Qty: 90 TABLET | Refills: 3 | Status: SHIPPED | OUTPATIENT
Start: 2022-09-20 | End: 2023-06-13 | Stop reason: SDUPTHER

## 2022-09-20 NOTE — TELEPHONE ENCOUNTER
Is the patient due for a refill? Yes     Was the patient seen the past year? Yes              Date of last office visit: 07/07/22     Does the patient have an upcoming appointment?  No        Provider to refill:Dr. Leal      Does the patients insurance require a 100 day supply?  No

## 2022-10-06 ENCOUNTER — PATIENT MESSAGE (OUTPATIENT)
Dept: CARDIOLOGY | Facility: MEDICAL CENTER | Age: 62
End: 2022-10-06
Payer: COMMERCIAL

## 2022-10-06 NOTE — LETTER
PROCEDURE/SURGERY CLEARANCE FORM      Encounter Date: 10/6/2022    Patient: Ashley Gunter  YOB: 1960    CARDIOLOGIST:  Jama Leal M.D.    REFERRING DOCTOR:  VIJAY    The above patient is cleared to have the following procedure/surgery: surgical wisdom teeth removal                                           Additional comments: ***                 MD Alex Leal M.D.       I cannot release the above patient for the following procedure/surgery without a cardiology evaluation:                                MD Alex Leal M.D.

## 2022-10-14 ENCOUNTER — TELEPHONE (OUTPATIENT)
Dept: CARDIOLOGY | Facility: MEDICAL CENTER | Age: 62
End: 2022-10-14
Payer: COMMERCIAL

## 2022-10-14 NOTE — TELEPHONE ENCOUNTER
Received fax from [facility]  Letter drafted and faxed to [phone]  Confirmation status received  Scan to [facility]     ---------------------------------------    BLANCA MCCARTY notes dated 7/7/22:

## 2023-01-12 ENCOUNTER — APPOINTMENT (RX ONLY)
Dept: URBAN - NONMETROPOLITAN AREA CLINIC 1 | Facility: CLINIC | Age: 63
Setting detail: DERMATOLOGY
End: 2023-01-12

## 2023-01-12 DIAGNOSIS — L40.0 PSORIASIS VULGARIS: ICD-10-CM | Status: WELL CONTROLLED

## 2023-01-12 DIAGNOSIS — Z12.83 ENCOUNTER FOR SCREENING FOR MALIGNANT NEOPLASM OF SKIN: ICD-10-CM

## 2023-01-12 PROCEDURE — ? COUNSELING

## 2023-01-12 PROCEDURE — 99213 OFFICE O/P EST LOW 20 MIN: CPT

## 2023-01-12 PROCEDURE — ? PRESCRIPTION

## 2023-01-12 RX ORDER — BETAMETHASONE DIPROPIONATE 0.5 MG/G
1 CREAM TOPICAL BID
Qty: 135 | Refills: 3 | Status: ERX

## 2023-01-12 RX ORDER — BETAMETHASONE DIPROPIONATE 0.5 MG/ML
1 LOTION TOPICAL BID
Qty: 180 | Refills: 3 | Status: ERX

## 2023-01-12 RX ORDER — BETAMETHASONE DIPROPIONATE 0.5 MG/G
OINTMENT TOPICAL
Qty: 135 | Refills: 3 | Status: ERX

## 2023-01-12 ASSESSMENT — LOCATION SIMPLE DESCRIPTION DERM
LOCATION SIMPLE: RIGHT UPPER BACK
LOCATION SIMPLE: LEFT THIGH
LOCATION SIMPLE: RIGHT THIGH
LOCATION SIMPLE: LOWER BACK

## 2023-01-12 ASSESSMENT — LOCATION DETAILED DESCRIPTION DERM
LOCATION DETAILED: RIGHT ANTERIOR DISTAL THIGH
LOCATION DETAILED: SUPERIOR LUMBAR SPINE
LOCATION DETAILED: LEFT ANTERIOR DISTAL THIGH
LOCATION DETAILED: RIGHT SUPERIOR MEDIAL UPPER BACK

## 2023-01-12 ASSESSMENT — LOCATION ZONE DERM
LOCATION ZONE: TRUNK
LOCATION ZONE: LEG

## 2023-01-31 ENCOUNTER — TELEPHONE (OUTPATIENT)
Dept: CARDIOLOGY | Facility: MEDICAL CENTER | Age: 63
End: 2023-01-31
Payer: COMMERCIAL

## 2023-01-31 NOTE — TELEPHONE ENCOUNTER
To DA: Clearance request received from Dr. Walter with [facility] for Extraction of upper left molar (#14) and bone grafting for future implant placement with IV sedation. Procedure date TBD. Last OV 07/07/2022.    Anticoags: Aspirin and Prasugrel    Implants: None    Requested information for cardiac clearance:  -Need for prophylactic antibiotics?  -Patient is medically cleared for planned procedure  -Patient is medically cleared for planned procedure with the following precautions and/or recommendations__________.  -Patient is not medically cleared for the planned procedure and requires further evaluation.  -Hold aspirin and prasugrel for _____days prior to procedure.    Fax: [phone]  Phone: [phone]    Form scanned to [facility]    To DA: Please review above bolded and advise, thank you!  To Nohemy: can you please follow up, as I will be OOO until 2/6. Thanks!

## 2023-02-01 NOTE — TELEPHONE ENCOUNTER
-----------------------------------  Letter drafted and faxed to [phone]  Confirmation status received  Scan to [facility]     -----------------------------------  Mychart message to christopher

## 2023-04-20 ENCOUNTER — PATIENT MESSAGE (OUTPATIENT)
Dept: CARDIOLOGY | Facility: MEDICAL CENTER | Age: 63
End: 2023-04-20
Payer: COMMERCIAL

## 2023-04-20 DIAGNOSIS — I21.4 NSTEMI (NON-ST ELEVATED MYOCARDIAL INFARCTION) (HCC): ICD-10-CM

## 2023-04-20 DIAGNOSIS — E78.2 MIXED HYPERCHOLESTEROLEMIA AND HYPERTRIGLYCERIDEMIA: ICD-10-CM

## 2023-04-20 RX ORDER — EZETIMIBE 10 MG/1
10 TABLET ORAL DAILY
Qty: 90 TABLET | Refills: 0 | Status: SHIPPED | OUTPATIENT
Start: 2023-04-20 | End: 2023-09-07

## 2023-04-28 DIAGNOSIS — E78.2 MIXED HYPERCHOLESTEROLEMIA AND HYPERTRIGLYCERIDEMIA: ICD-10-CM

## 2023-05-02 ENCOUNTER — PATIENT MESSAGE (OUTPATIENT)
Dept: CARDIOLOGY | Facility: MEDICAL CENTER | Age: 63
End: 2023-05-02
Payer: COMMERCIAL

## 2023-05-02 DIAGNOSIS — E78.2 MIXED HYPERCHOLESTEROLEMIA AND HYPERTRIGLYCERIDEMIA: ICD-10-CM

## 2023-05-02 DIAGNOSIS — E78.5 DYSLIPIDEMIA: ICD-10-CM

## 2023-05-04 ENCOUNTER — PATIENT MESSAGE (OUTPATIENT)
Dept: CARDIOLOGY | Facility: MEDICAL CENTER | Age: 63
End: 2023-05-04
Payer: COMMERCIAL

## 2023-05-04 NOTE — TELEPHONE ENCOUNTER
Left patient a voicemail.  Can you please order repeat lipid panel to be completed in 3 months and let her know?    Thanks.

## 2023-06-13 DIAGNOSIS — I21.4 NSTEMI (NON-ST ELEVATED MYOCARDIAL INFARCTION) (HCC): ICD-10-CM

## 2023-06-13 DIAGNOSIS — I10 ESSENTIAL HYPERTENSION: ICD-10-CM

## 2023-06-14 NOTE — TELEPHONE ENCOUNTER
Is the patient due for a refill? Yes    Was the patient seen the past year? Yes    Date of last office visit: 7/7/2022    Does the patient have an upcoming appointment?  Yes   If yes, When? 8/9/2023    Provider to refill:BLANCA    Does the patients insurance require a 100 day supply?  No

## 2023-06-15 RX ORDER — METOPROLOL SUCCINATE 25 MG/1
25 TABLET, EXTENDED RELEASE ORAL DAILY
Qty: 90 TABLET | Refills: 0 | Status: SHIPPED | OUTPATIENT
Start: 2023-06-15 | End: 2023-10-02

## 2023-06-15 RX ORDER — ATORVASTATIN CALCIUM 80 MG/1
80 TABLET, FILM COATED ORAL DAILY
Qty: 90 TABLET | Refills: 0 | Status: SHIPPED | OUTPATIENT
Start: 2023-06-15 | End: 2023-09-07

## 2023-06-29 ENCOUNTER — PATIENT MESSAGE (OUTPATIENT)
Dept: CARDIOLOGY | Facility: MEDICAL CENTER | Age: 63
End: 2023-06-29
Payer: COMMERCIAL

## 2023-07-31 ENCOUNTER — TELEPHONE (OUTPATIENT)
Dept: CARDIOLOGY | Facility: MEDICAL CENTER | Age: 63
End: 2023-07-31
Payer: COMMERCIAL

## 2023-07-31 NOTE — TELEPHONE ENCOUNTER
Called pt in regards to lab work that was ordered at previous OV. LVM for call back to see if the labs were completed somewhere outside of [facility]. Pt has follow up appointment scheduled with DA on 08/09/23.

## 2023-08-07 NOTE — TELEPHONE ENCOUNTER
BLANCA     Caller: Ashley Gunter    Topic/issue: Patient is returning call and is requesting lab orders be sent to [facility]. Please advise.     Callback Number: [phone] (home) [phone] (work)    Thank you,   Dayanna NUÑEZ

## 2023-08-09 ENCOUNTER — APPOINTMENT (OUTPATIENT)
Dept: CARDIOLOGY | Facility: MEDICAL CENTER | Age: 63
End: 2023-08-09
Payer: COMMERCIAL

## 2023-08-15 ENCOUNTER — OFFICE VISIT (OUTPATIENT)
Dept: CARDIOLOGY | Facility: MEDICAL CENTER | Age: 63
End: 2023-08-15
Attending: INTERNAL MEDICINE
Payer: COMMERCIAL

## 2023-08-15 VITALS
HEIGHT: 64 IN | RESPIRATION RATE: 16 BRPM | DIASTOLIC BLOOD PRESSURE: 80 MMHG | WEIGHT: 180 LBS | SYSTOLIC BLOOD PRESSURE: 136 MMHG | BODY MASS INDEX: 30.73 KG/M2 | OXYGEN SATURATION: 100 % | HEART RATE: 72 BPM

## 2023-08-15 DIAGNOSIS — E78.5 DYSLIPIDEMIA: ICD-10-CM

## 2023-08-15 DIAGNOSIS — I10 ESSENTIAL HYPERTENSION: ICD-10-CM

## 2023-08-15 DIAGNOSIS — I25.10 CORONARY ARTERY DISEASE, OCCLUSIVE: ICD-10-CM

## 2023-08-15 DIAGNOSIS — Z95.5 S/P CORONARY ARTERY STENT PLACEMENT: ICD-10-CM

## 2023-08-15 DIAGNOSIS — I25.2 HISTORY OF INFERIOR WALL MYOCARDIAL INFARCTION: ICD-10-CM

## 2023-08-15 LAB — EKG IMPRESSION: NORMAL

## 2023-08-15 PROCEDURE — 3075F SYST BP GE 130 - 139MM HG: CPT | Performed by: INTERNAL MEDICINE

## 2023-08-15 PROCEDURE — 93010 ELECTROCARDIOGRAM REPORT: CPT | Performed by: INTERNAL MEDICINE

## 2023-08-15 PROCEDURE — 93005 ELECTROCARDIOGRAM TRACING: CPT | Performed by: INTERNAL MEDICINE

## 2023-08-15 PROCEDURE — 3079F DIAST BP 80-89 MM HG: CPT | Performed by: INTERNAL MEDICINE

## 2023-08-15 PROCEDURE — 99212 OFFICE O/P EST SF 10 MIN: CPT | Performed by: INTERNAL MEDICINE

## 2023-08-15 PROCEDURE — 99214 OFFICE O/P EST MOD 30 MIN: CPT | Performed by: INTERNAL MEDICINE

## 2023-08-15 ASSESSMENT — ENCOUNTER SYMPTOMS
SHORTNESS OF BREATH: 0
LOSS OF CONSCIOUSNESS: 0
MYALGIAS: 0
COUGH: 0
PALPITATIONS: 0
DIZZINESS: 0

## 2023-08-15 ASSESSMENT — FIBROSIS 4 INDEX: FIB4 SCORE: 4.95

## 2023-08-15 NOTE — PROGRESS NOTES
Chief Complaint   Patient presents with    MI (Non ST Segment Elevation MI)     F/V Dx: NSTEMI (non-ST elevated myocardial infarction) (HCC)    Hypertension    Hyperlipidemia       Subjective     Ashley Gunter is a 62 y.o. female who presents today for follow-up cardiac care.    The patient has CAD, inferior STEMI, PCI distal RCA 2.5 x 12 mm GENEVIEVE, hypertension and dyslipidemia.    Previously followed by Jama Leal MD last seen 7/7/2022.  Since her last appointment she has had no cardiac symptoms of chest pain, palpitations or shortness of breath.  She still struggles lose weight and plans to see a new PCP  Padmini Fields MD that specializes in weight loss and is particularly interested in trying GP 1 agonist treatment.    Past Medical History:   Diagnosis Date    Hypertension      No past surgical history on file.  Family History   Problem Relation Age of Onset    Heart Disease Neg Hx      Social History     Socioeconomic History    Marital status: Single     Spouse name: Not on file    Number of children: Not on file    Years of education: Not on file    Highest education level: Bachelor's degree (e.g., BA, AB, BS)   Occupational History    Not on file   Tobacco Use    Smoking status: Never    Smokeless tobacco: Never   Substance and Sexual Activity    Alcohol use: Yes    Drug use: Yes     Types: Marijuana, Inhaled, Oral     Comment: Occ    Sexual activity: Not on file   Other Topics Concern    Not on file   Social History Narrative    Not on file     Social Determinants of Health     Financial Resource Strain: Low Risk  (6/25/2023)    Overall Financial Resource Strain (CARDIA)     Difficulty of Paying Living Expenses: Not very hard   Food Insecurity: No Food Insecurity (6/25/2023)    Hunger Vital Sign     Worried About Running Out of Food in the Last Year: Never true     Ran Out of Food in the Last Year: Never true   Transportation Needs: No Transportation Needs (6/25/2023)    PRAPARE - Transportation      Lack of Transportation (Medical): No     Lack of Transportation (Non-Medical): No   Physical Activity: Sufficiently Active (6/25/2023)    Exercise Vital Sign     Days of Exercise per Week: 3 days     Minutes of Exercise per Session: 50 min   Stress: Stress Concern Present (6/25/2023)    [employer] [place] [employer] - Occupational Stress Questionnaire     Feeling of Stress : To some extent   Social Connections: Moderately Integrated (6/25/2023)    Social Connection and Isolation Panel [NHANES]     Frequency of Communication with Friends and Family: Twice a week     Frequency of Social Gatherings with Friends and Family: Once a week     Attends Congregation Services: 1 to 4 times per year     Active Member of Clubs or Organizations: No     Attends Club or Organization Meetings: Not on file     Marital Status: Living with partner   Intimate Partner Violence: Not on file   Housing Stability: Low Risk  (6/25/2023)    Housing Stability Vital Sign     Unable to Pay for Housing in the Last Year: No     Number of Places Lived in the Last Year: 1     Unstable Housing in the Last Year: No     Allergies   Allergen Reactions    Other Drug      Dextromethorphan-pot Guaiac-gg  Other reaction(s): Other (See Comments)  jittery      Ace Inhibitors      Other reaction(s): Cough  cough  cough       Outpatient Encounter Medications as of 8/15/2023   Medication Sig Dispense Refill    atorvastatin (LIPITOR) 80 MG tablet Take 1 Tablet by mouth every day. 90 Tablet 0    metoprolol SR (TOPROL XL) 25 MG TABLET SR 24 HR Take 1 Tablet by mouth every day. 90 Tablet 0    ezetimibe (ZETIA) 10 MG Tab Take 1 Tablet by mouth every day. 90 Tablet 0    betamethasone dipropionate 0.05 % lotion Apply  topically 2 times a day as needed.      valACYclovir (VALTREX) 500 MG Tab Take 500 mg by mouth as needed.      aspirin EC 81 MG EC tablet Take 1 Tablet by mouth every day. 30 Tablet 1    [DISCONTINUED] prasugrel (EFFIENT) 10 MG Tab Take 1 Tablet by  "mouth every day. 90 Tablet 3     No facility-administered encounter medications on file as of 8/15/2023.     Review of Systems   Respiratory:  Negative for cough and shortness of breath.    Cardiovascular:  Negative for chest pain and palpitations.   Musculoskeletal:  Negative for myalgias.   Neurological:  Negative for dizziness and loss of consciousness.              Objective     /80 (BP Location: Left arm, Patient Position: Sitting, BP Cuff Size: Adult)   Pulse 72   Resp 16   Ht 1.626 m (5' 4\")   Wt 81.6 kg (180 lb)   SpO2 100%   BMI 30.90 kg/m²     Physical Exam  Eyes:      Conjunctiva/sclera: Conjunctivae normal.      Pupils: Pupils are equal, round, and reactive to light.   Neck:      Vascular: No JVD.   Cardiovascular:      Rate and Rhythm: Normal rate and regular rhythm.      Pulses: Normal pulses.      Heart sounds: Normal heart sounds.   Pulmonary:      Effort: Pulmonary effort is normal. No accessory muscle usage or respiratory distress.      Breath sounds: Normal breath sounds. No wheezing or rales.   Musculoskeletal:      Right lower leg: No edema.      Left lower leg: No edema.   Skin:     General: Skin is warm and dry.      Findings: No rash.      Nails: There is no clubbing.   Neurological:      Mental Status: She is alert and oriented to person, place, and time.   Psychiatric:         Behavior: Behavior normal.            EKG 8/15/2023 sinus rhythm, within normal limits, personally reviewed    Assessment & Plan     1. Coronary artery disease, occlusive  EKG      2. History of inferior wall myocardial infarction        3. Dyslipidemia  LIPID PANEL      4. S/P coronary artery stent placement        5. Essential hypertension            Medical Decision Making: Today's Assessment/Status/Plan:   Regarding CAD/MI/PCI, clinically stable, continue metoprolol, atorvastatin, aspirin, ezetimibe.  Regarding hypertension, BP slightly elevated today, instructed patient to maintain daily BP log and to " notify me if BP remains greater than/equal to 130/80.  Regarding dyslipidemia, reviewed most recent lab work 8/14/2023 at [facility] with LDL 75 on is atorvastatin 80 mg daily and ezetimibe 10 mg daily, discussed potential future treatments but will await treatment intervention with new PCP Padmini Fields MD regarding weight loss, provided lipid panel lab slip to be done subsequent to interventional  RTC 6 months

## 2023-09-06 DIAGNOSIS — I21.4 NSTEMI (NON-ST ELEVATED MYOCARDIAL INFARCTION) (HCC): ICD-10-CM

## 2023-09-06 DIAGNOSIS — E78.5 DYSLIPIDEMIA: ICD-10-CM

## 2023-09-07 RX ORDER — ATORVASTATIN CALCIUM 80 MG/1
80 TABLET, FILM COATED ORAL DAILY
Qty: 90 TABLET | Refills: 3 | Status: SHIPPED | OUTPATIENT
Start: 2023-09-07 | End: 2024-03-11 | Stop reason: SDUPTHER

## 2023-09-11 NOTE — PROCEDURE: BOTOX
Depressor Anguli Oris Units: 0
Lot #: [id number]
Consent: Written consent obtained. Risks include but not limited to lid/brow ptosis, bruising, swelling, diplopia, temporary effect, incomplete chemical denervation.
Dilution (U/0.1 Cc): 4
Forehead Units: 20
Expiration Date (Month Year): 05/2020
Detail Level: Detailed
Post-Care Instructions: Patient instructed to not lie down for 4 hours and limit physical activity for 24 hours.
Price (Use Numbers Only, No Special Characters Or $): 973
Secondary Defect Length In Cm (Required For Flaps): 0

## 2023-09-27 DIAGNOSIS — I10 ESSENTIAL HYPERTENSION: ICD-10-CM

## 2023-09-28 NOTE — TELEPHONE ENCOUNTER
Is the patient due for a refill? Yes    Was the patient seen the past year? Yes    Date of last office visit: 08/15/23    Does the patient have an upcoming appointment?  No    Provider to refill:SW    Does the patients insurance require a 100 day supply?  No

## 2023-10-02 RX ORDER — METOPROLOL SUCCINATE 25 MG/1
25 TABLET, EXTENDED RELEASE ORAL DAILY
Qty: 90 TABLET | Refills: 3 | Status: SHIPPED | OUTPATIENT
Start: 2023-10-02 | End: 2024-03-11 | Stop reason: SDUPTHER

## 2024-02-06 ENCOUNTER — TELEPHONE (OUTPATIENT)
Dept: CARDIOLOGY | Facility: MEDICAL CENTER | Age: 64
End: 2024-02-06
Payer: COMMERCIAL

## 2024-02-06 DIAGNOSIS — I10 ESSENTIAL HYPERTENSION: ICD-10-CM

## 2024-02-06 NOTE — TELEPHONE ENCOUNTER
SIVAKUMAR  Caller: Ashley Gunter     Topic/issue: Patient would like to have her lab orders sent to her via fax.   Fax: [phone]    Callback Number: [phone]      Thank you,  Nan SIBLEY

## 2024-02-07 NOTE — TELEPHONE ENCOUNTER
Lab orders have been faxed to the number provided below but fax did not go through unable to send fax.

## 2024-02-08 NOTE — TELEPHONE ENCOUNTER
SIVAKUMAR  Caller: Ashley Gunter     Topic/issue: Patient is at [employer] and needs to have her lab orders faxed.   Fax: [phone]    Callback Number: [phone]     Thank you,  Nan SIBLEY

## 2024-02-08 NOTE — TELEPHONE ENCOUNTER
Reviewed chart, pt has LP ordered by SIVAKUMAR and CMP ordered by Dr. Leal who is no longer in our office. Re-ordered CMP for SW and sent both SW orders to pt via [employer] messages.     S/W pt, informed her that this was done and asked her to call us if she has any further issues. Ok to put her through to me if she calls back. Pt is still at lab waiting.

## 2024-02-08 NOTE — TELEPHONE ENCOUNTER
SIVAKUMAR  Caller: Ashley Gunter     Topic/issue: Patient will be coming around 12:00PM to  a hard copy of her lab orders.    Callback Number: [phone]     Thank you,  Nan SIBLEY

## 2024-02-08 NOTE — TELEPHONE ENCOUNTER
Gail- I tried faxing these labs to the number below. Unable to get it to go thru. To you for follow up Thank you.

## 2024-02-09 DIAGNOSIS — I10 ESSENTIAL HYPERTENSION: ICD-10-CM

## 2024-02-14 ENCOUNTER — OFFICE VISIT (OUTPATIENT)
Dept: CARDIOLOGY | Facility: MEDICAL CENTER | Age: 64
End: 2024-02-14
Attending: INTERNAL MEDICINE
Payer: COMMERCIAL

## 2024-02-14 VITALS
BODY MASS INDEX: 30.9 KG/M2 | SYSTOLIC BLOOD PRESSURE: 140 MMHG | WEIGHT: 181 LBS | HEART RATE: 77 BPM | DIASTOLIC BLOOD PRESSURE: 80 MMHG | HEIGHT: 64 IN | OXYGEN SATURATION: 94 % | RESPIRATION RATE: 16 BRPM

## 2024-02-14 DIAGNOSIS — E78.2 MIXED HYPERLIPIDEMIA: ICD-10-CM

## 2024-02-14 DIAGNOSIS — I10 PRIMARY HYPERTENSION: ICD-10-CM

## 2024-02-14 DIAGNOSIS — I25.10 CORONARY ARTERY DISEASE INVOLVING NATIVE CORONARY ARTERY OF NATIVE HEART WITHOUT ANGINA PECTORIS: ICD-10-CM

## 2024-02-14 DIAGNOSIS — G45.4 TRANSIENT GLOBAL AMNESIA: ICD-10-CM

## 2024-02-14 DIAGNOSIS — Z95.5 S/P CORONARY ARTERY STENT PLACEMENT: ICD-10-CM

## 2024-02-14 PROCEDURE — 99214 OFFICE O/P EST MOD 30 MIN: CPT | Performed by: NURSE PRACTITIONER

## 2024-02-14 PROCEDURE — 99212 OFFICE O/P EST SF 10 MIN: CPT | Performed by: NURSE PRACTITIONER

## 2024-02-14 PROCEDURE — 3079F DIAST BP 80-89 MM HG: CPT | Performed by: NURSE PRACTITIONER

## 2024-02-14 PROCEDURE — 3077F SYST BP >= 140 MM HG: CPT | Performed by: NURSE PRACTITIONER

## 2024-02-14 RX ORDER — LOSARTAN POTASSIUM 25 MG/1
25 TABLET ORAL DAILY
Qty: 90 TABLET | Refills: 3 | Status: SHIPPED | OUTPATIENT
Start: 2024-02-14

## 2024-02-14 ASSESSMENT — ENCOUNTER SYMPTOMS
COUGH: 0
PALPITATIONS: 0
SHORTNESS OF BREATH: 0
WEAKNESS: 0
FALLS: 0
LOSS OF CONSCIOUSNESS: 0
BLURRED VISION: 0
ORTHOPNEA: 0
FOCAL WEAKNESS: 0
WHEEZING: 0
HEADACHES: 0
DOUBLE VISION: 0
DIZZINESS: 0

## 2024-02-14 NOTE — PROGRESS NOTES
Chief Complaint   Patient presents with    Hyperlipidemia     F/V Dx: HLD (hyperlipidemia)    Hypertension     F/X Dx: HTN (hypertension)    MI (Non ST Segment Elevation MI)     F/X Dx : NSTEMI (non-ST elevated myocardial infarction) (HCC)       Subjective     Ashley Gunter is a 63 y.o. female who presents today for follow up CAD.    She is followed by Dr. Markham in our clinic, history of  CAD, inferior STEMI 10/2021, PCI distal RCA 2.5 x 12 mm GENEVIEVE, hypertension and dyslipidemia. History of transient global amnesia.     Patient is doing well. No cardiovascular complaints. She is working hard to lose weight and seeing her primary care provider for weight loss medication management.     Past Medical History:   Diagnosis Date    Hypertension      History reviewed. No pertinent surgical history.  Family History   Problem Relation Age of Onset    Heart Disease Neg Hx      Social History     Socioeconomic History    Marital status: Single     Spouse name: Not on file    Number of children: Not on file    Years of education: Not on file    Highest education level: Bachelor's degree (e.g., BA, AB, BS)   Occupational History    Not on file   Tobacco Use    Smoking status: Never    Smokeless tobacco: Never   Substance and Sexual Activity    Alcohol use: Yes    Drug use: Yes     Types: Marijuana, Inhaled, Oral     Comment: Occ    Sexual activity: Not on file   Other Topics Concern    Not on file   Social History Narrative    Not on file     Social Determinants of Health     Financial Resource Strain: Low Risk  (6/25/2023)    Overall Financial Resource Strain (CARDIA)     Difficulty of Paying Living Expenses: Not very hard   Food Insecurity: No Food Insecurity (6/25/2023)    Hunger Vital Sign     Worried About Running Out of Food in the Last Year: Never true     Ran Out of Food in the Last Year: Never true   Transportation Needs: No Transportation Needs (6/25/2023)    PRAPARE - Transportation     Lack of Transportation  (Medical): No     Lack of Transportation (Non-Medical): No   Physical Activity: Sufficiently Active (6/25/2023)    Exercise Vital Sign     Days of Exercise per Week: 3 days     Minutes of Exercise per Session: 50 min   Stress: Stress Concern Present (6/25/2023)    [employer] [place] [employer] - Occupational Stress Questionnaire     Feeling of Stress : To some extent   Social Connections: Moderately Integrated (6/25/2023)    Social Connection and Isolation Panel [NHANES]     Frequency of Communication with Friends and Family: Twice a week     Frequency of Social Gatherings with Friends and Family: Once a week     Attends Sikh Services: 1 to 4 times per year     Active Member of Clubs or Organizations: No     Attends Club or Organization Meetings: Not on file     Marital Status: Living with partner   Intimate Partner Violence: Not on file   Housing Stability: Low Risk  (6/25/2023)    Housing Stability Vital Sign     Unable to Pay for Housing in the Last Year: No     Number of Places Lived in the Last Year: 1     Unstable Housing in the Last Year: No     Allergies   Allergen Reactions    Other Drug      Dextromethorphan-pot Guaiac-gg  Other reaction(s): Other (See Comments)  jittery      Ace Inhibitors      Other reaction(s): Cough  cough  cough       Outpatient Encounter Medications as of 2/14/2024   Medication Sig Dispense Refill    losartan (COZAAR) 25 MG Tab Take 1 Tablet by mouth every day. 90 Tablet 3    metoprolol SR (TOPROL XL) 25 MG TABLET SR 24 HR TAKE 1 TABLET BY MOUTH DAILY 90 Tablet 3    atorvastatin (LIPITOR) 80 MG tablet Take 1 Tablet by mouth every day. Please complete ordered lab work! Thank you 90 Tablet 3    ezetimibe (ZETIA) 10 MG Tab Take 1 Tablet by mouth every day. Please complete ordered lab work for further refills. 90 Tablet 0    betamethasone dipropionate 0.05 % lotion Apply  topically 2 times a day as needed.      valACYclovir (VALTREX) 500 MG Tab Take 500 mg by mouth as  "needed.      aspirin EC 81 MG EC tablet Take 1 Tablet by mouth every day. 30 Tablet 1     No facility-administered encounter medications on file as of 2/14/2024.     Review of Systems   Constitutional:  Negative for malaise/fatigue.   Eyes:  Negative for blurred vision and double vision.   Respiratory:  Negative for cough, shortness of breath and wheezing.    Cardiovascular:  Negative for chest pain, palpitations, orthopnea and leg swelling.   Musculoskeletal:  Negative for falls.   Neurological:  Negative for dizziness, focal weakness, loss of consciousness, weakness and headaches.   All other systems reviewed and are negative.             Objective     BP (!) 140/80 (BP Location: Left arm, Patient Position: Sitting, BP Cuff Size: Adult)   Pulse 77   Resp 16   Ht 1.626 m (5' 4\")   Wt 82.1 kg (181 lb)   SpO2 94%   BMI 31.07 kg/m²     Physical Exam  Constitutional:       General: She is not in acute distress.     Appearance: She is well-developed. She is not diaphoretic.   HENT:      Head: Normocephalic and atraumatic.   Eyes:      Pupils: Pupils are equal, round, and reactive to light.   Neck:      Vascular: No JVD.   Cardiovascular:      Rate and Rhythm: Normal rate and regular rhythm.      Heart sounds: Normal heart sounds.   Pulmonary:      Effort: Pulmonary effort is normal.      Breath sounds: Normal breath sounds.   Abdominal:      General: Bowel sounds are normal. There is no distension.      Palpations: Abdomen is soft.   Skin:     General: Skin is warm and dry.   Neurological:      Mental Status: She is alert and oriented to person, place, and time.   Psychiatric:         Behavior: Behavior normal.         Thought Content: Thought content normal.         Judgment: Judgment normal.            Cardiac Catheterization:   10/21/2021  Procedures:  Insertion of 5/6 FR sheath in the right radial artery  right and left coronary arteriograms  Left heart catheterization and Left ventriculogram  Angioplasty and " placement of a 2.5 by 12mm Synergy drug-eluting stent in distal  right coronary artery.     Final diagnosis:   S/p successful PCI of distal RCA.  Residual moderate disease in left anterior descending artery.     Recommendations: If patient has angina, consider stress test as an outpatient to evaluate LAD disease.  Guideline directed medical therapy and risk factor management      Coronary arteriograms:  Left main: normal  Left anterior descending: Diffuse long segment moderate 60 to 70% stenosis in proximal to mid LAD stenosis. Apical LAD free of significant disease. Diagonal branches are small.   Left circumflex: Luminal irregularities without significant disease.  Gives first marginal/ramus in proximal portion with , posterolateral branches distally.    Right coronary: Codominant vessel, diffuse moderate disease (30 to 40%) in proximal to midportion .subtotally occluded in distal portion 99% stenosis, co-dominant     Left Heart Catheterization:  Left Ventriculogram: ejection fraction 55%  Left Ventricular EDP: 16 mm Hg   Aortic Valve Gradient: No significant AV gradient noted      Echocardiography   10/23/2021  The left ventricular ejection fraction is visually estimated to be 55-60%.  Diastolic function could not be estimated as tissue Doppler was not   performed.  Normal right ventricular size and systolic function.  Insufficient TR to estimate RVSP.  Upper normal left atrial size.  No significant valvular abnormalities.  Normal IVC.    Assessment & Plan     1. Coronary artery disease involving native coronary artery of native heart without angina pectoris  Lipid Profile      2. S/P coronary artery stent placement  Lipid Profile      3. Mixed hyperlipidemia        4. Primary hypertension  losartan (COZAAR) 25 MG Tab      5. Transient global amnesia            Medical Decision Making: Today's Assessment/Status/Plan:        CAD   Hypertension   Hyperlipidemia   - denies any angina or RAMOS  - continue asa and  atorvastatin 80mg qd/ zetia 10mg qd   - continue metoprolol XL 25mg qd  - elevated blood pressure, start losartan 25mg qd   - repeat lipid profile     2. BMI 31  - patient is great candidate for  Glucagon-Like Peptide-1 (GLP-1) Receptor Agonist in setting of CAD.       Follow up in 6 months, lipid profile prior to next appointment.

## 2024-03-10 ENCOUNTER — PATIENT MESSAGE (OUTPATIENT)
Dept: CARDIOLOGY | Facility: MEDICAL CENTER | Age: 64
End: 2024-03-10
Payer: COMMERCIAL

## 2024-03-10 DIAGNOSIS — E78.2 MIXED HYPERCHOLESTEROLEMIA AND HYPERTRIGLYCERIDEMIA: ICD-10-CM

## 2024-03-10 DIAGNOSIS — I21.4 NSTEMI (NON-ST ELEVATED MYOCARDIAL INFARCTION) (HCC): ICD-10-CM

## 2024-03-10 DIAGNOSIS — I10 ESSENTIAL HYPERTENSION: ICD-10-CM

## 2024-03-11 DIAGNOSIS — I25.10 CORONARY ARTERY DISEASE INVOLVING NATIVE CORONARY ARTERY OF NATIVE HEART WITHOUT ANGINA PECTORIS: ICD-10-CM

## 2024-03-11 RX ORDER — EZETIMIBE 10 MG/1
10 TABLET ORAL DAILY
Qty: 90 TABLET | Refills: 3 | Status: SHIPPED | OUTPATIENT
Start: 2024-03-11

## 2024-03-11 RX ORDER — ATORVASTATIN CALCIUM 80 MG/1
80 TABLET, FILM COATED ORAL DAILY
Qty: 90 TABLET | Refills: 3 | Status: SHIPPED | OUTPATIENT
Start: 2024-03-11

## 2024-03-11 RX ORDER — METOPROLOL SUCCINATE 25 MG/1
25 TABLET, EXTENDED RELEASE ORAL DAILY
Qty: 90 TABLET | Refills: 3 | Status: SHIPPED | OUTPATIENT
Start: 2024-03-11

## 2024-03-13 RX ORDER — METOPROLOL SUCCINATE 25 MG/1
25 TABLET, EXTENDED RELEASE ORAL DAILY
Qty: 90 TABLET | Refills: 3 | OUTPATIENT
Start: 2024-03-13

## 2024-03-13 RX ORDER — EZETIMIBE 10 MG/1
10 TABLET ORAL DAILY
Qty: 90 TABLET | Refills: 0 | OUTPATIENT
Start: 2024-03-13

## 2024-03-13 RX ORDER — ATORVASTATIN CALCIUM 80 MG/1
80 TABLET, FILM COATED ORAL DAILY
Qty: 90 TABLET | Refills: 3 | OUTPATIENT
Start: 2024-03-13

## 2024-03-19 ENCOUNTER — PATIENT MESSAGE (OUTPATIENT)
Dept: CARDIOLOGY | Facility: MEDICAL CENTER | Age: 64
End: 2024-03-19
Payer: COMMERCIAL

## 2024-03-20 ENCOUNTER — PATIENT MESSAGE (OUTPATIENT)
Dept: CARDIOLOGY | Facility: MEDICAL CENTER | Age: 64
End: 2024-03-20
Payer: COMMERCIAL

## 2024-03-20 NOTE — PROGRESS NOTES
I would have to do further investigation to evaluate the risk and benefit.     Thank you  Gerardoet Sharan BUSTAMANTE

## 2024-08-06 ENCOUNTER — PATIENT MESSAGE (OUTPATIENT)
Dept: CARDIOLOGY | Facility: MEDICAL CENTER | Age: 64
End: 2024-08-06
Payer: COMMERCIAL

## 2024-08-07 ENCOUNTER — PATIENT MESSAGE (OUTPATIENT)
Dept: CARDIOLOGY | Facility: MEDICAL CENTER | Age: 64
End: 2024-08-07
Payer: COMMERCIAL

## 2024-08-09 ENCOUNTER — TELEPHONE (OUTPATIENT)
Dept: CARDIOLOGY | Facility: MEDICAL CENTER | Age: 64
End: 2024-08-09
Payer: COMMERCIAL

## 2024-08-09 DIAGNOSIS — I25.10 CORONARY ARTERY DISEASE INVOLVING NATIVE CORONARY ARTERY OF NATIVE HEART WITHOUT ANGINA PECTORIS: ICD-10-CM

## 2024-08-09 ASSESSMENT — ENCOUNTER SYMPTOMS
FOCAL WEAKNESS: 0
BLURRED VISION: 0
LOSS OF CONSCIOUSNESS: 0
SHORTNESS OF BREATH: 0
PALPITATIONS: 0
WHEEZING: 0
FALLS: 0
HEADACHES: 0
ORTHOPNEA: 0
DOUBLE VISION: 0
WEAKNESS: 0
COUGH: 0
DIZZINESS: 0

## 2024-08-09 NOTE — TELEPHONE ENCOUNTER
Phone Number Called: [phone]     Call outcome: Left detailed message for patient. Informed to call back with any additional questions.

## 2024-08-09 NOTE — PROGRESS NOTES
Chief Complaint   Patient presents with    Coronary Artery Disease     F/V Dx: Coronary artery disease involving native coronary artery of native heart without angina pectoris    Hypertension    Hyperlipidemia       Subjective     Ashley Gunter is a 63 y.o. female who presents today for follow up CAD.    She is followed by Dr. Markham in our clinic, history of  CAD, inferior STEMI 10/2021, PCI distal RCA 2.5 x 12 mm GENEVIEVE, hypertension and dyslipidemia. History of transient global amnesia.     Last Ov 2/2024, blood pressure was elevated. Started on losartan 25mg qd. Patient is doing well. No cardiovascular complaints. She is working hard to lose weight and seeing her primary care provider for weight loss medication management.     Past Medical History:   Diagnosis Date    Hypertension      History reviewed. No pertinent surgical history.  Family History   Problem Relation Age of Onset    Heart Disease Neg Hx      Social History     Socioeconomic History    Marital status: Single     Spouse name: Not on file    Number of children: Not on file    Years of education: Not on file    Highest education level: Bachelor's degree (e.g., BA, AB, BS)   Occupational History    Not on file   Tobacco Use    Smoking status: Never    Smokeless tobacco: Never   Substance and Sexual Activity    Alcohol use: Yes    Drug use: Yes     Types: Marijuana, Inhaled, Oral     Comment: Occ    Sexual activity: Not on file   Other Topics Concern    Not on file   Social History Narrative    Not on file     Social Determinants of Health     Financial Resource Strain: Low Risk  (6/25/2023)    Overall Financial Resource Strain (CARDIA)     Difficulty of Paying Living Expenses: Not very hard   Food Insecurity: No Food Insecurity (6/25/2023)    Hunger Vital Sign     Worried About Running Out of Food in the Last Year: Never true     Ran Out of Food in the Last Year: Never true   Transportation Needs: No Transportation Needs (6/25/2023)    PRAPARE -  Transportation     Lack of Transportation (Medical): No     Lack of Transportation (Non-Medical): No   Physical Activity: Sufficiently Active (6/25/2023)    Exercise Vital Sign     Days of Exercise per Week: 3 days     Minutes of Exercise per Session: 50 min   Stress: Stress Concern Present (6/25/2023)    [employer] [place] [employer] - Occupational Stress Questionnaire     Feeling of Stress : To some extent   Social Connections: Moderately Integrated (6/25/2023)    Social Connection and Isolation Panel [NHANES]     Frequency of Communication with Friends and Family: Twice a week     Frequency of Social Gatherings with Friends and Family: Once a week     Attends Bahai Services: 1 to 4 times per year     Active Member of Clubs or Organizations: No     Attends Club or Organization Meetings: Not on file     Marital Status: Living with partner   Intimate Partner Violence: Not on file   Housing Stability: Low Risk  (6/25/2023)    Housing Stability Vital Sign     Unable to Pay for Housing in the Last Year: No     Number of Places Lived in the Last Year: 1     Unstable Housing in the Last Year: No     Allergies   Allergen Reactions    Other Drug      Dextromethorphan-pot Guaiac-gg  Other reaction(s): Other (See Comments)  jittery      Ace Inhibitors      Other reaction(s): Cough  cough  cough       Outpatient Encounter Medications as of 8/14/2024   Medication Sig Dispense Refill    ezetimibe (ZETIA) 10 MG Tab Take 1 Tablet by mouth every day. Please complete ordered lab work for further refills. 90 Tablet 3    atorvastatin (LIPITOR) 80 MG tablet Take 1 Tablet by mouth every day. Please complete ordered lab work! Thank you 90 Tablet 3    metoprolol SR (TOPROL XL) 25 MG TABLET SR 24 HR Take 1 Tablet by mouth every day. 90 Tablet 3    losartan (COZAAR) 25 MG Tab Take 1 Tablet by mouth every day. 90 Tablet 3    betamethasone dipropionate 0.05 % lotion Apply  topically 2 times a day as needed.      valACYclovir  "(VALTREX) 500 MG Tab Take 500 mg by mouth as needed.      aspirin EC 81 MG EC tablet Take 1 Tablet by mouth every day. 30 Tablet 1     No facility-administered encounter medications on file as of 8/14/2024.     Review of Systems   Constitutional:  Negative for malaise/fatigue.   Eyes:  Negative for blurred vision and double vision.   Respiratory:  Negative for cough, shortness of breath and wheezing.    Cardiovascular:  Negative for chest pain, palpitations, orthopnea and leg swelling.   Musculoskeletal:  Negative for falls.   Neurological:  Negative for dizziness, focal weakness, loss of consciousness, weakness and headaches.   All other systems reviewed and are negative.             Objective     /72 (BP Location: Left arm, Patient Position: Sitting, BP Cuff Size: Adult)   Pulse 72   Resp 16   Ht 1.626 m (5' 4\")   Wt 80.6 kg (177 lb 9.6 oz)   SpO2 97%   BMI 30.48 kg/m²     Physical Exam  Constitutional:       General: She is not in acute distress.     Appearance: She is well-developed. She is not diaphoretic.   HENT:      Head: Normocephalic and atraumatic.   Eyes:      Pupils: Pupils are equal, round, and reactive to light.   Neck:      Vascular: No JVD.   Cardiovascular:      Rate and Rhythm: Normal rate and regular rhythm.      Heart sounds: Normal heart sounds.   Pulmonary:      Effort: Pulmonary effort is normal.      Breath sounds: Normal breath sounds.   Abdominal:      General: Bowel sounds are normal. There is no distension.      Palpations: Abdomen is soft.   Musculoskeletal:      Right lower leg: No edema.      Left lower leg: No edema.   Skin:     General: Skin is warm and dry.   Neurological:      Mental Status: She is alert and oriented to person, place, and time.   Psychiatric:         Behavior: Behavior normal.         Thought Content: Thought content normal.         Judgment: Judgment normal.            Cardiac Catheterization:   10/21/2021  Procedures:  Insertion of 5/6 FR sheath in " the right radial artery  right and left coronary arteriograms  Left heart catheterization and Left ventriculogram  Angioplasty and placement of a 2.5 by 12mm Synergy drug-eluting stent in distal  right coronary artery.     Final diagnosis:   S/p successful PCI of distal RCA.  Residual moderate disease in left anterior descending artery.     Recommendations: If patient has angina, consider stress test as an outpatient to evaluate LAD disease.  Guideline directed medical therapy and risk factor management      Coronary arteriograms:  Left main: normal  Left anterior descending: Diffuse long segment moderate 60 to 70% stenosis in proximal to mid LAD stenosis. Apical LAD free of significant disease. Diagonal branches are small.   Left circumflex: Luminal irregularities without significant disease.  Gives first marginal/ramus in proximal portion with , posterolateral branches distally.    Right coronary: Codominant vessel, diffuse moderate disease (30 to 40%) in proximal to midportion .subtotally occluded in distal portion 99% stenosis, co-dominant     Left Heart Catheterization:  Left Ventriculogram: ejection fraction 55%  Left Ventricular EDP: 16 mm Hg   Aortic Valve Gradient: No significant AV gradient noted      Echocardiography   10/23/2021  The left ventricular ejection fraction is visually estimated to be 55-60%.  Diastolic function could not be estimated as tissue Doppler was not   performed.  Normal right ventricular size and systolic function.  Insufficient TR to estimate RVSP.  Upper normal left atrial size.  No significant valvular abnormalities.  Normal IVC.    Assessment & Plan     1. Coronary artery disease involving native coronary artery of native heart without angina pectoris  Lipid Profile      2. Mixed hyperlipidemia  Lipid Profile      3. Primary hypertension              Medical Decision Making: Today's Assessment/Status/Plan:        CAD   Hypertension   Hyperlipidemia   - denies any angina or  RAMOS  - continue asa and atorvastatin 80mg qd/ zetia 10mg qd   - continue metoprolol XL 25mg qd and losartan 25mg qd   - repeat lipid profile       Follow up in 6 months, lipid profile prior to next appointment.

## 2024-08-12 ENCOUNTER — PATIENT MESSAGE (OUTPATIENT)
Dept: CARDIOLOGY | Facility: MEDICAL CENTER | Age: 64
End: 2024-08-12
Payer: COMMERCIAL

## 2024-08-14 ENCOUNTER — OFFICE VISIT (OUTPATIENT)
Dept: CARDIOLOGY | Facility: MEDICAL CENTER | Age: 64
End: 2024-08-14
Attending: NURSE PRACTITIONER
Payer: COMMERCIAL

## 2024-08-14 VITALS
DIASTOLIC BLOOD PRESSURE: 72 MMHG | RESPIRATION RATE: 16 BRPM | WEIGHT: 177.6 LBS | BODY MASS INDEX: 30.32 KG/M2 | OXYGEN SATURATION: 97 % | HEART RATE: 72 BPM | HEIGHT: 64 IN | SYSTOLIC BLOOD PRESSURE: 128 MMHG

## 2024-08-14 DIAGNOSIS — E78.2 MIXED HYPERLIPIDEMIA: ICD-10-CM

## 2024-08-14 DIAGNOSIS — I25.10 CORONARY ARTERY DISEASE INVOLVING NATIVE CORONARY ARTERY OF NATIVE HEART WITHOUT ANGINA PECTORIS: ICD-10-CM

## 2024-08-14 DIAGNOSIS — I10 PRIMARY HYPERTENSION: ICD-10-CM

## 2024-08-14 PROCEDURE — 99212 OFFICE O/P EST SF 10 MIN: CPT | Performed by: NURSE PRACTITIONER

## 2024-08-14 PROCEDURE — 3078F DIAST BP <80 MM HG: CPT | Performed by: NURSE PRACTITIONER

## 2024-08-14 PROCEDURE — 99214 OFFICE O/P EST MOD 30 MIN: CPT | Performed by: NURSE PRACTITIONER

## 2024-08-14 PROCEDURE — 3074F SYST BP LT 130 MM HG: CPT | Performed by: NURSE PRACTITIONER

## 2024-10-17 ENCOUNTER — APPOINTMENT (RX ONLY)
Dept: URBAN - NONMETROPOLITAN AREA CLINIC 1 | Facility: CLINIC | Age: 64
Setting detail: DERMATOLOGY
End: 2024-10-17

## 2024-10-17 DIAGNOSIS — L40.0 PSORIASIS VULGARIS: ICD-10-CM | Status: WELL CONTROLLED

## 2024-10-17 DIAGNOSIS — L91.8 OTHER HYPERTROPHIC DISORDERS OF THE SKIN: ICD-10-CM

## 2024-10-17 DIAGNOSIS — L82.1 OTHER SEBORRHEIC KERATOSIS: ICD-10-CM

## 2024-10-17 DIAGNOSIS — Z12.83 ENCOUNTER FOR SCREENING FOR MALIGNANT NEOPLASM OF SKIN: ICD-10-CM

## 2024-10-17 PROCEDURE — ? COUNSELING

## 2024-10-17 PROCEDURE — ? PRESCRIPTION

## 2024-10-17 PROCEDURE — 99213 OFFICE O/P EST LOW 20 MIN: CPT

## 2024-10-17 RX ORDER — BETAMETHASONE DIPROPIONATE 0.5 MG/G
1 CREAM TOPICAL BID
Qty: 135 | Refills: 6 | Status: ERX

## 2024-10-17 RX ORDER — BETAMETHASONE DIPROPIONATE 0.5 MG/ML
1 LOTION TOPICAL BID
Qty: 180 | Refills: 6 | Status: ERX

## 2024-10-17 RX ORDER — BETAMETHASONE DIPROPIONATE 0.5 MG/G
OINTMENT TOPICAL
Qty: 135 | Refills: 6 | Status: ERX

## 2024-10-17 ASSESSMENT — ITCH NUMERIC RATING SCALE: HOW SEVERE IS YOUR ITCHING?: 2

## 2024-10-17 ASSESSMENT — LOCATION ZONE DERM
LOCATION ZONE: ARM
LOCATION ZONE: TRUNK
LOCATION ZONE: NECK

## 2024-10-17 ASSESSMENT — LOCATION SIMPLE DESCRIPTION DERM
LOCATION SIMPLE: RIGHT ELBOW
LOCATION SIMPLE: RIGHT UPPER BACK
LOCATION SIMPLE: CHEST
LOCATION SIMPLE: LEFT FOREARM
LOCATION SIMPLE: RIGHT ANTERIOR NECK
LOCATION SIMPLE: LEFT ANTERIOR NECK

## 2024-10-17 ASSESSMENT — LOCATION DETAILED DESCRIPTION DERM
LOCATION DETAILED: RIGHT ELBOW
LOCATION DETAILED: RIGHT INFERIOR LATERAL NECK
LOCATION DETAILED: RIGHT SUPERIOR MEDIAL UPPER BACK
LOCATION DETAILED: LEFT PROXIMAL DORSAL FOREARM
LOCATION DETAILED: LEFT INFERIOR ANTERIOR NECK
LOCATION DETAILED: UPPER STERNUM

## 2024-10-17 ASSESSMENT — BSA PSORIASIS: % BODY COVERED IN PSORIASIS: 10

## 2024-10-23 RX ORDER — BETAMETHASONE DIPROPIONATE 0.5 MG/G
1 CREAM TOPICAL BID
Qty: 135 | Refills: 6 | Status: CANCELLED
Stop reason: ENTERED-IN-ERROR

## 2024-12-06 ENCOUNTER — APPOINTMENT (OUTPATIENT)
Dept: URBAN - NONMETROPOLITAN AREA CLINIC 1 | Facility: CLINIC | Age: 64
Setting detail: DERMATOLOGY
End: 2024-12-06

## 2024-12-06 DIAGNOSIS — Z41.9 ENCOUNTER FOR PROCEDURE FOR PURPOSES OTHER THAN REMEDYING HEALTH STATE, UNSPECIFIED: ICD-10-CM

## 2024-12-06 PROCEDURE — ? BOTOX

## 2024-12-06 NOTE — PROCEDURE: BOTOX
Show Additional Area 2: Yes
Lateral Platysmal Bands Units: 0
Price (Use Numbers Only, No Special Characters Or $): 392
Consent: Written consent reviewed by RN and signed by pt. Risks include but not limited to lid/brow ptosis, bruising, swelling, diplopia, temporary effect, incomplete chemical denervation.   \\nPt's taking blood thinners are counseled on the increased risk of bleeding and bruising at the injection site.
Post-Care Instructions: Patient instructed to not lie down for 4 hours and limit physical activity for 24 hours. RN recommends topical arnica and/or ice if bruising presents.\\n\\nPt instructed to contact the clinic with any questions, concerns, or post treatment complications.\\n\\Henok pt concerns and questions addressed and pt verbalized understanding.
Dilution (U/0.1 Cc): 4
Detail Level: Detailed
Forehead Units: 8
Incrementing Botox Units: By 0.5 Units
Comments: Pt asked to animate muscles in the treatment area and musculature was palpated for proper injection placement.
Lot #: [id number]
Glabellar Complex Units: 20

## 2025-01-08 ENCOUNTER — APPOINTMENT (OUTPATIENT)
Dept: URBAN - NONMETROPOLITAN AREA CLINIC 1 | Facility: CLINIC | Age: 65
Setting detail: DERMATOLOGY
End: 2025-01-08

## 2025-01-08 DIAGNOSIS — L91.8 OTHER HYPERTROPHIC DISORDERS OF THE SKIN: ICD-10-CM

## 2025-01-08 PROCEDURE — ? BENIGN DESTRUCTION COSMETIC MULTI

## 2025-01-08 PROCEDURE — ? COUNSELING

## 2025-01-08 ASSESSMENT — LOCATION ZONE DERM: LOCATION ZONE: NECK

## 2025-01-08 ASSESSMENT — LOCATION SIMPLE DESCRIPTION DERM: LOCATION SIMPLE: RIGHT ANTERIOR NECK

## 2025-01-08 ASSESSMENT — LOCATION DETAILED DESCRIPTION DERM: LOCATION DETAILED: RIGHT CLAVICULAR NECK

## 2025-01-08 NOTE — PROCEDURE: BENIGN DESTRUCTION COSMETIC MULTI
Post-Care Instructions: I reviewed with the patient in detail post-care instructions. Patient is to wear sun protection, and avoid picking at any of the treated lesions. Pt may apply Vaseline or Polysorin to crusted or scabbing areas.
Anesthesia Type: 1% lidocaine with 1:100,000 epinephrine and a 1:10 solution of 8.4% sodium bicarbonate
Consent: The patient's verbal consent was obtained including but not limited to risks of crusting, scabbing, blistering, scarring, darker or lighter pigmentary change, recurrence, incomplete removal and infection.
Anesthesia Volume In Cc: 3
Total Number Of Lesions Treated: 15
Detail Level: Zone
Price (Use Numbers Only, No Special Characters Or $): 160

## 2025-01-28 ENCOUNTER — TELEPHONE (OUTPATIENT)
Dept: CARDIOLOGY | Facility: MEDICAL CENTER | Age: 65
End: 2025-01-28
Payer: COMMERCIAL

## 2025-01-28 PROBLEM — M17.12 OSTEOARTHRITIS OF LEFT KNEE: Status: ACTIVE | Noted: 2025-01-27

## 2025-01-28 NOTE — TELEPHONE ENCOUNTER
RT    Caller: Ashley Gunter     Where labs will be completed: [facility]    Fax/Phone number for lab:N/a (Pt is asking to have them faxed asap she will be going first thing in the morning)     Upcoming Appointment Date: 01/30/2025    Callback Number: [phone]      Thank You  Michelle STEPHEN

## 2025-01-29 NOTE — TELEPHONE ENCOUNTER
Called pt, updated that lab orders faxed over.     Lab orders faxed to [facility] [phone]

## 2025-01-30 ENCOUNTER — OFFICE VISIT (OUTPATIENT)
Dept: CARDIOLOGY | Facility: MEDICAL CENTER | Age: 65
End: 2025-01-30
Attending: INTERNAL MEDICINE
Payer: COMMERCIAL

## 2025-01-30 VITALS
BODY MASS INDEX: 31.24 KG/M2 | HEIGHT: 64 IN | HEART RATE: 68 BPM | SYSTOLIC BLOOD PRESSURE: 120 MMHG | DIASTOLIC BLOOD PRESSURE: 78 MMHG | WEIGHT: 183 LBS | OXYGEN SATURATION: 97 % | RESPIRATION RATE: 12 BRPM

## 2025-01-30 DIAGNOSIS — I10 ESSENTIAL HYPERTENSION: ICD-10-CM

## 2025-01-30 DIAGNOSIS — I21.4 NSTEMI (NON-ST ELEVATED MYOCARDIAL INFARCTION) (HCC): ICD-10-CM

## 2025-01-30 DIAGNOSIS — Z95.5 S/P CORONARY ARTERY STENT PLACEMENT: ICD-10-CM

## 2025-01-30 DIAGNOSIS — I10 PRIMARY HYPERTENSION: ICD-10-CM

## 2025-01-30 DIAGNOSIS — E78.2 MIXED HYPERCHOLESTEROLEMIA AND HYPERTRIGLYCERIDEMIA: ICD-10-CM

## 2025-01-30 LAB — EKG IMPRESSION: NORMAL

## 2025-01-30 PROCEDURE — 93005 ELECTROCARDIOGRAM TRACING: CPT | Mod: TC | Performed by: INTERNAL MEDICINE

## 2025-01-30 PROCEDURE — 99212 OFFICE O/P EST SF 10 MIN: CPT | Performed by: INTERNAL MEDICINE

## 2025-01-30 RX ORDER — ATORVASTATIN CALCIUM 80 MG/1
80 TABLET, FILM COATED ORAL DAILY
Qty: 90 TABLET | Refills: 3 | Status: SHIPPED | OUTPATIENT
Start: 2025-01-30

## 2025-01-30 RX ORDER — LOSARTAN POTASSIUM 25 MG/1
25 TABLET ORAL DAILY
Qty: 90 TABLET | Refills: 3 | Status: SHIPPED | OUTPATIENT
Start: 2025-01-30

## 2025-01-30 RX ORDER — METOPROLOL SUCCINATE 25 MG/1
25 TABLET, EXTENDED RELEASE ORAL DAILY
Qty: 90 TABLET | Refills: 3 | Status: SHIPPED | OUTPATIENT
Start: 2025-01-30

## 2025-01-30 RX ORDER — EZETIMIBE 10 MG/1
10 TABLET ORAL DAILY
Qty: 90 TABLET | Refills: 3 | Status: SHIPPED | OUTPATIENT
Start: 2025-01-30

## 2025-01-30 RX ORDER — CLOPIDOGREL BISULFATE 75 MG/1
75 TABLET ORAL DAILY
Qty: 90 TABLET | Refills: 3 | Status: SHIPPED | OUTPATIENT
Start: 2025-01-30

## 2025-01-30 RX ORDER — CHLORAL HYDRATE 500 MG
1000 CAPSULE ORAL 2 TIMES DAILY
COMMUNITY
Start: 2025-01-30

## 2025-01-30 NOTE — PROGRESS NOTES
Chief Complaint   Patient presents with    MI (Non ST Segment Elevation MI)     F/V DX: NSTEMI (non-ST elevated myocardial infarction) (HCC)          Subjective     Ashley Gunter is a 64 y.o. female who presents today with CAD post PCI in 2021 for ACS  moderate to severe residual disease    Doing well but needs knee surgery    Past Medical History:   Diagnosis Date    Hypertension      History reviewed. No pertinent surgical history.  Family History   Problem Relation Age of Onset    Heart Disease Neg Hx      Social History     Socioeconomic History    Marital status: Single     Spouse name: Not on file    Number of children: Not on file    Years of education: Not on file    Highest education level: Bachelor's degree (e.g., BA, AB, BS)   Occupational History    Not on file   Tobacco Use    Smoking status: Never    Smokeless tobacco: Never   Substance and Sexual Activity    Alcohol use: Yes    Drug use: Yes     Types: Marijuana, Inhaled, Oral     Comment: Occ    Sexual activity: Not on file   Other Topics Concern    Not on file   Social History Narrative    Not on file     Social Drivers of Health     Financial Resource Strain: Low Risk  (1/24/2025)    Overall Financial Resource Strain (CARDIA)     Difficulty of Paying Living Expenses: Not very hard   Food Insecurity: No Food Insecurity (1/24/2025)    Hunger Vital Sign     Worried About Running Out of Food in the Last Year: Never true     Ran Out of Food in the Last Year: Never true   Transportation Needs: No Transportation Needs (1/24/2025)    PRAPARE - Transportation     Lack of Transportation (Medical): No     Lack of Transportation (Non-Medical): No   Physical Activity: Sufficiently Active (1/24/2025)    Exercise Vital Sign     Days of Exercise per Week: 5 days     Minutes of Exercise per Session: 50 min   Stress: Stress Concern Present (1/24/2025)    [employer] [place] [employer] - Occupational Stress Questionnaire     Feeling of Stress : To some  extent   Social Connections: Unknown (1/24/2025)    Social Connection and Isolation Panel [NHANES]     Frequency of Communication with Friends and Family: More than three times a week     Frequency of Social Gatherings with Friends and Family: Three times a week     Attends Restorationism Services: Patient declined     Active Member of Clubs or Organizations: Patient declined     Attends Club or Organization Meetings: Patient declined     Marital Status: Living with partner   Intimate Partner Violence: Not on file   Housing Stability: Unknown (1/24/2025)    Housing Stability Vital Sign     Unable to Pay for Housing in the Last Year: No     Number of Times Moved in the Last Year: Not on file     Homeless in the Last Year: No     Allergies   Allergen Reactions    Other Drug      Dextromethorphan-pot Guaiac-gg  Other reaction(s): Other (See Comments)  jittery      Ace Inhibitors      Other reaction(s): Cough  cough  cough       Outpatient Encounter Medications as of 1/30/2025   Medication Sig Dispense Refill    atorvastatin (LIPITOR) 80 MG tablet Take 1 Tablet by mouth every day. 90 Tablet 3    ezetimibe (ZETIA) 10 MG Tab Take 1 Tablet by mouth every day. 90 Tablet 3    clopidogrel (PLAVIX) 75 MG Tab Take 1 Tablet by mouth every day. 90 Tablet 3    Omega-3 Fatty Acids (FISH OIL) 1000 MG Cap capsule Take 1 Capsule by mouth 2 times a day.      metoprolol SR (TOPROL XL) 25 MG TABLET SR 24 HR Take 1 Tablet by mouth every day. 90 Tablet 3    losartan (COZAAR) 25 MG Tab Take 1 Tablet by mouth every day. 90 Tablet 3    betamethasone dipropionate 0.05 % lotion Apply  topically 2 times a day as needed.      valACYclovir (VALTREX) 500 MG Tab Take 500 mg by mouth as needed.      [DISCONTINUED] ezetimibe (ZETIA) 10 MG Tab Take 1 Tablet by mouth every day. Please complete ordered lab work for further refills. 90 Tablet 3    [DISCONTINUED] atorvastatin (LIPITOR) 80 MG tablet Take 1 Tablet by mouth every day. Please complete ordered lab  "work! Thank you 90 Tablet 3    [DISCONTINUED] metoprolol SR (TOPROL XL) 25 MG TABLET SR 24 HR Take 1 Tablet by mouth every day. 90 Tablet 3    [DISCONTINUED] losartan (COZAAR) 25 MG Tab Take 1 Tablet by mouth every day. 90 Tablet 3    [DISCONTINUED] aspirin EC 81 MG EC tablet Take 1 Tablet by mouth every day. 30 Tablet 1     No facility-administered encounter medications on file as of 1/30/2025.     ROS           Objective     /78   Pulse 68   Resp 12   Ht 1.626 m (5' 4\")   Wt 83 kg (183 lb)   SpO2 97%   BMI 31.41 kg/m²     Physical Exam  Constitutional:       General: She is not in acute distress.     Appearance: She is not diaphoretic.   Eyes:      General: No scleral icterus.  Neck:      Vascular: No JVD.   Cardiovascular:      Rate and Rhythm: Normal rate.      Heart sounds: Normal heart sounds. No murmur heard.     No friction rub. No gallop.   Pulmonary:      Effort: No respiratory distress.      Breath sounds: No wheezing or rales.   Abdominal:      General: Bowel sounds are normal.      Palpations: Abdomen is soft.   Musculoskeletal:      Right lower leg: No edema.      Left lower leg: No edema.   Skin:     Findings: No rash.   Neurological:      Mental Status: She is alert. Mental status is at baseline.   Psychiatric:         Mood and Affect: Mood normal.            We reviewed in person the most recent labs  Recent Results (from the past 30 weeks)   LIPID PANEL    Collection Time: 08/12/24  8:23 AM   Result Value Ref Range    Cholesterol,Tot 149 100 - 199 mg/dL    Triglycerides 206 (H) 0 - 149 mg/dL    HDL 39 (L) >39 mg/dL    VLDL Cholesterol Calc 34 5 - 40 mg/dL    LDL Chol Calc (NIH) 76 0 - 99 mg/dL    LDL Calc Comment: CANCELED    BASIC METABOLIC PANEL (8)    Collection Time: 08/12/24  9:30 AM   Result Value Ref Range    Glucose 105 (H) 70 - 99 mg/dL    Bun 11 8 - 27 mg/dL    Creatinine 0.81 0.57 - 1.00 mg/dL    eGFR 83 >59 mL/min/1.73    Bun-Creatinine Ratio 14 12 - 28    Sodium 143 134 - " 144 mmol/L    Potassium 4.7 3.5 - 5.2 mmol/L    Chloride 108 (H) 96 - 106 mmol/L    Co2 22 20 - 29 mmol/L    Calcium 9.6 8.7 - 10.3 mg/dL   LIPID PANEL    Collection Time: 24  9:30 AM   Result Value Ref Range    Cholesterol,Tot 143 100 - 199 mg/dL    Triglycerides 209 (H) 0 - 149 mg/dL    HDL 39 (L) >39 mg/dL    VLDL Cholesterol Calc 35 5 - 40 mg/dL    LDL Chol Calc (NIH) 69 0 - 99 mg/dL    LDL Calc Comment: CANCELED    CBC WITH DIFFERENTIAL    Collection Time: 25  8:35 AM   Result Value Ref Range    WBC 3.8 (L) 4.0 - 10.0 K/uL    RBC 4.78 3.70 - 5.30 M/uL    Hemoglobin 14.1 12.0 - 16.0 g/dL    Hematocrit 42.1 36.0 - 47.0 %    MCV 88.1 81.0 - 100.0 fL    MCH 29.5 26.0 - 34.0 pg    MCHC 33.5 33.0 - 37.0 g/dL    RDW 12.6 11.5 - 14.5 %    Platelet Count 179 130 - 400 K/uL    MPV 10.2 8.5 - 12.5 fL    Neutrophils-Polys 50 42 - 75 %    Lymphocytes 36 21 - 51 %    Monocytes 10 2 - 12 %    Eosinophils 4 0 - 7 %    Basophils 1 0 - 2 %    Immature Granulocytes 0 0 - 1 %    Neutrophils (Absolute) 1.91 1.50 - 8.00 K/uL    Lymphs (Absolute) 1.39 0.70 - 4.50 K/uL    Monos (Absolute) 0.37 0.10 - 1.00 K/uL    Eos (Absolute) 0.14 0.00 - 0.40 K/uL    Baso (Absolute) 0.02 0.00 - 0.20 K/uL    Immature Granulocytes (abs) 0.01 0.00 - 2.90 K/uL   EKG    Collection Time: 25  3:29 PM   Result Value Ref Range    Report       [facility]    Test Date:  2025  Pt Name:    NATASHA GILL                 Department: PsychiatricB  MRN:        [id number]                      Room:  Gender:     Female                       Technician: DANIE  :        1960                   Requested By:ALEX MONTANO  Order #:    [id number]                    Reading MD:    Measurements  Intervals                                Axis  Rate:       66                           P:          16  KS:         156                          QRS:        49  QRSD:       88                           T:          36  QT:         453  QTc:         475    Interpretive Statements  Sinus rhythm  Borderline low voltage, extremity leads  Consider anterior infarct  Compared to ECG 08/15/2023 13:18:21  Myocardial infarct finding now present           Assessment & Plan     1. NSTEMI (non-ST elevated myocardial infarction) (HCC)  EKG    ezetimibe (ZETIA) 10 MG Tab      2. S/P coronary artery stent placement  clopidogrel (PLAVIX) 75 MG Tab      3. NSTEMI (non-ST elevated myocardial infarction) (HCC) Active EKG    atorvastatin (LIPITOR) 80 MG tablet    ezetimibe (ZETIA) 10 MG Tab      4. Mixed hypercholesterolemia and hypertriglyceridemia  ezetimibe (ZETIA) 10 MG Tab    Omega-3 Fatty Acids (FISH OIL) 1000 MG Cap capsule      5. Essential hypertension  metoprolol SR (TOPROL XL) 25 MG TABLET SR 24 HR      6. Primary hypertension  losartan (COZAAR) 25 MG Tab          Medical Decision Making: Today's Assessment/Status/Plan:        It was my pleasure to meet with Ms. Gunter.    We addressed the management of hypertension at today's visit. Blood pressure is well controlled.  We specifically assessed the labs on hypertension treatment    We addressed the management of dyslipidemia and atherosclerosis at today's visit. She is on appropriate lipid lowering medication.    We addressed the management of atherosclerotic artery disease.  She is on proper antiplatelet, cholesterol management as appropriate.  We addressed the potential side effects and laboratory follow-up for these medications.  SWITCH to PLAVIX    If had symptoms angio is more appropraite rather than stress given prior residual disease    For knee surgery She can proceed with the proposed procedure or surgery from a cardiac standpoint, no modifiable cardiovascular risk, no further cardiac testing required, hold antiplatelet as necessary, resume as soon as possible typically when patient is able to take oral medications.    It is my pleasure to participate in the care of Ms. Gunter.  Please do not hesitate to contact  me with questions or concerns. [facility] Card[facility] is available 24/7 for consultative services at [phone] in the perioperative period.    I will see Ms. Gunter back in 1 year time and encouraged her to follow up with us over the phone or electronically using my MyChart as issues arise.    It is my pleasure to participate in the care of Ms. Gunter.  Please do not hesitate to contact me with questions or concerns.    Jayjay Hernandez MD PhD [facility]  Cardiologist [facility]    Please note that this dictation was created using voice recognition software. There may be errors I did not discover before finalizing the note.     () Today's E/M visit is associated with medical care services that serve as the continuing focal point for all needed health care services and/or with medical care services that  are part of ongoing care related to a patient's single, serious condition, or a complex condition: This includes  furnishing services to patients on an ongoing basis that result in care that is personalized  to the patient. The services result in a comprehensive, longitudinal, and continuous  relationship with the patient and involve delivery of team-based care that is accessible, coordinated with other practitioners and providers, and integrated with the broader health  care landscape.

## 2025-01-30 NOTE — LETTER
PROCEDURE/SURGERY CLEARANCE FORM      Encounter Date: 1/30/2025    Patient: Ashley Gunter  YOB: 1960    CARDIOLOGIST:  Jayjay Hernandez M.D.    REFERRING DOCTOR:  Dr Parr      The above patient is cleared to have the following procedure/surgery: knee surgery                                           Additional comments: She can proceed with the proposed procedure or surgery from a cardiac standpoint, no modifiable cardiovascular risk, no further cardiac testing required, hold antiplatelet as necessary, resume as soon as possible typically when patient is able to take oral medications.    It is my pleasure to participate in the care of Ms. Gunter.  Please do not hesitate to contact me with questions or concerns. [facility] Card[facility] is available 24/7 for consultative services at [phone] in the perioperative period.    Electronically Signed    Jayjay Hernandez MD PhD FAC  Cardiologist [facility]

## 2025-01-31 NOTE — PATIENT INSTRUCTIONS
A - Antiplatelet - Clopidogrel (PLAVIX) reduces your risk of cardiac event by 27% compared to Aspirin 81 mg daily (HOST EXAM study 2021), prasrugrel (EFFIENT), ticagrelor (BRILINTA)) may be used for the first year.  Aspirin 81 mg daily is associated with a 20% less use of heart event and is used the first year after a cardiac event, stent or CABG.  B - Blood Pressure Control - reduces your risk or heart attack and stroke, the goal is <130/80.  C - Cholesterol Management - statins dramatically reduce your risk; for those that are intolerant to statins, there are alternatives.  D - Diet - MEDITERRANEAN DIET or Cardiac rehab diets, Cardiosmart.org.  E - Exercise - at least 2.5 hours of moderate exercise weekly  (typical brisk walking or similar activity).  F - Fats - VASCEPA, or EPA Fish oil (if Vascepa too expensive) for elevated triglycerides (REDUCE IT trial showed reduction from 22% 5 year MACE to 17%).  G - Good Vibes - meditation, exercise, yoga, Pilates, mindfulness, Darwin-Chi, stress reduction.  H - Heart Failure - betablockers, sucubatril (ENTRESTO) 16% less risk of dying over 3 years, spironolactone, empagliflozin (JARDIANCE) 17% less risk of dying over 2 years, CRT +/- ICD.  I - Inflammation - Colchicine in the LoDoCo2 study in 2020 reduced the risk of heart attack by 30% in 2.5 year follow up.  R - Rehab - Cardiac Rehab reduces risk of dying by 13-24% and need to go to the hospital by 30% within the first year. Compared to regular Cardiac Rehab, Intensive Cardiac Rehab ([place] [facility]) was shown to reduce the risk of major events 17% to 11% and hospitalization for CHF from 8% to 2%. (Nutrients 1107Thd01(11:4656)  S - Smoking - never smoke, if you do smoke ask for help to quit for good. Patients who quit smoking after heart attack have 36% less likely risk of dying.  Resources are 1-800-QUIT-NOW [employer] in addition to Chantix, bupropion (Zyban) or nicotine replacement  T - Type II Diabetes  - pills empagliflozin (JARDIANCE) 38% less risk of dying over 4 years, and/or weekly injections: tirzepatide (Mounjaro), semaglutide (Ozempic), liraglutide (Victoza), dulaglutide (Trulicity) ~26% less risk of MACE in 2 years.  V - Vaccines - Annual flu shot and COVID vaccine reduces the risk of serious cardiovascular complications from these deadly infections.  W - Weight - maintain a healthy weight. Semaglutide (WEGOVY) weekly injection was shown to reduce weight by 10% and heart events by 20% for patients with CAD and BMI > 27 in the SELECT trial (6.5% vs 8% in 48 month follow up [facility] 12/2023).      Work on at least 2.5 - 5 hours a week of moderate exercise    Please look into the following diets and incorporate them into your diet  LOW SALT DIET   KEEP YOUR SODIUM EQUAL TO CALORIES AND NO MORE THAN DOUBLE THE CALORIES FOR A LOW SALT DIET    Cardiosmart.org - great resource for American College of Cardiology on heart disease prevention and treatment    FOR TREATMENT OR PREVENTION OF CORONARY ARTERY DISEASE  These three programs are approved by Medicare/Insurers for those with heart disease  [facility]  Dr. Alvarez's Program for Reversing Heart Disease - Jimbo Quesada's Cardiologist vegetarian-based  [facility]iac [facility]ness Program - [place]-based mind-body Program    Mediterranean Diet has been shown to be a hearty healthy diet.    This is a commonly referenced Program  Dr Gant - [place] over [place] (book and documentary) - vegetarian-based    FOR TREATMENT OF BLOOD PRESSURE  DASH DIET - American Heart Association for treatment of HYPERTENSION    FOR TREATMENT OF BAD CHOLESTEROL/FATS  REDUCE PROCESSED SUGAR AS MUCH AS POSSIBLE  INCREASE WHOLE GRAINS/VEGETABLES  INCREASE FIBER    Lowering total cholesterol and LDL (bad) cholesterol:  - Eat leaner cuts of meat, or eliminate altogether if possible red meat, and frequently substitute fish or chicken.  - Limit saturated  fat to no more than 7-10% of total calories no more than 10 g per day is recommended. Some sources of saturated fat include butter, animal fats, hydrogenated vegetable fats and oils, many desserts, whole milk dairy products.  - Replaced saturated fats with polyunsaturated fats and monounsaturated fats. Foods high in monounsaturated fat include nuts, canola oil, avocados, and olives.  - Limit trans fat (processed foods) and replaced with fresh fruits and vegetables  - Recommend nonfat dairy products  - Increase substantially the amount of soluble fiber intake (legumes such as beans, fruit, whole grains).  - Consider nutritional supplements: plant sterile spreads such as Benecol, fish oil,  flaxseed oil, omega-3 acids EPA capsules 2000 mg twice a day, or viscous fiber such as Metamucil  - Attain ideal weight and regular exercise (at least 30 minutes per day of moderate exercise)  ASK ABOUT STATIN OR NON STATIN MEDICATION TO REDUCE YOUR LDL AND HEART RISK    Lowering triglycerides:  - Reduce intake of simple sugar: Desserts, candy, pastries, honey, sodas, sugared cereals, yogurt, Gatorade, sports bars, canned fruit, smoothies, fruit juice, coffee drinks  - Reduced intake of refined starches: Refined Pasta, most bread  - Reduce or abstain from alcohol  - Increase omega-3 fatty acids: [place], Trout, Mackerel, [place], [place] tuna and supplements  - Attain ideal weight and regular exercise (at least 30 minutes per day of moderate exercise)  ASK ABOUT PURIFIED OMEGA 3 EPA or FISH OIL TO REDUCE YOUR TG AND HEART RISK    Elevating HDL (good) cholesterol:  - Increase physical activity  - Increase omega-3 fatty acids and supplements as listed above  - Incorporating appropriate amounts of monounsaturated fats such as nuts, olive oil, canola oil, avocados, olives  - Stop smoking  - Attain ideal weight and regular exercise (at least 30 minutes per day of moderate exercise)

## 2025-03-06 ENCOUNTER — TELEPHONE (OUTPATIENT)
Dept: CARDIOLOGY | Facility: MEDICAL CENTER | Age: 65
End: 2025-03-06

## 2025-03-06 NOTE — TELEPHONE ENCOUNTER
CW    Caller: Leticia    Name and Department:     [facility]  P: [phone]  F: [phone] (attn: APS Department)    Topic/Issue: MEDICAL ADVICE    Per Shagufta    Ashley has a form that was faxed over yesterday that needs to be filled out and faxed back urgently. Please advise.    Thank you,  Timo LYN    Callback Number or Extension: [phone]

## 2025-03-06 NOTE — TELEPHONE ENCOUNTER
Phone Number Called: [phone]    Call outcome: Spoke to patient regarding message below.    Message: Called to discuss paperwork.  Patient states this is from her life insurance company and they are wanting info on her CAD.    CW- Would you be willing to sign patient life insurance forms regarding CAD? Thank you.

## 2025-03-08 NOTE — TELEPHONE ENCOUNTER
I completed form and faxed, the original is at my desk please scan to her chart and confirm receipt of my fax    thanks

## 2025-03-11 ENCOUNTER — TELEPHONE (OUTPATIENT)
Dept: CARDIOLOGY | Facility: MEDICAL CENTER | Age: 65
End: 2025-03-11

## 2025-03-11 NOTE — TELEPHONE ENCOUNTER
----- Message from Nurse Practitioner DANETTE Delgado sent at 3/11/2025 12:34 PM PDT -----  Regarding: clopidogrel hold  Dr Hernandez;  I see you recently changed pt to clopidogrel from ASA.  Ok to hold clopidogrel for 7 days prior to her planned total knee arthroplasty scheduled on 3/25/25? Last dose to be 3/18/25  Thank you, Marisa

## 2025-03-11 NOTE — TELEPHONE ENCOUNTER
Yes She can proceed with the proposed procedure or surgery from a cardiac standpoint, no modifiable cardiovascular risk, no further cardiac testing required, hold antiplatelet as necessary, resume as soon as possible typically when patient is able to take oral medications.    It is my pleasure to participate in the care of Ms. Gunter.  Please do not hesitate to contact me with questions or concerns. [facility] Cardiology is available 24/7 for consultative services at [phone] in the perioperative period.    Electronically Signed    Jayjay Hernandez MD PhD FACC  Cardiologist [facility]

## 2025-03-18 ENCOUNTER — APPOINTMENT (OUTPATIENT)
Dept: RADIOLOGY | Facility: MEDICAL CENTER | Age: 65
End: 2025-03-18
Attending: NURSE PRACTITIONER
Payer: COMMERCIAL

## 2025-03-18 DIAGNOSIS — G45.4 TRANSIENT GLOBAL AMNESIA: ICD-10-CM

## 2025-03-18 DIAGNOSIS — Z01.818 PRE-OP TESTING: ICD-10-CM

## 2025-03-18 PROCEDURE — 700117 HCHG RX CONTRAST REV CODE 255: Performed by: NURSE PRACTITIONER

## 2025-03-18 PROCEDURE — 70498 CT ANGIOGRAPHY NECK: CPT

## 2025-03-18 PROCEDURE — 70496 CT ANGIOGRAPHY HEAD: CPT

## 2025-03-18 RX ADMIN — IOHEXOL 100 ML: 350 INJECTION, SOLUTION INTRAVENOUS at 17:45

## 2025-03-19 ENCOUNTER — HOSPITAL ENCOUNTER (OUTPATIENT)
Dept: RADIOLOGY | Facility: MEDICAL CENTER | Age: 65
End: 2025-03-19
Attending: NURSE PRACTITIONER
Payer: COMMERCIAL

## 2025-03-19 DIAGNOSIS — G45.4 TRANSIENT GLOBAL AMNESIA: ICD-10-CM

## 2025-03-19 DIAGNOSIS — Z01.818 PRE-OP TESTING: ICD-10-CM

## 2025-03-19 PROCEDURE — 70551 MRI BRAIN STEM W/O DYE: CPT

## 2025-03-21 ENCOUNTER — APPOINTMENT (OUTPATIENT)
Dept: RADIOLOGY | Facility: MEDICAL CENTER | Age: 65
End: 2025-03-21
Attending: NURSE PRACTITIONER
Payer: COMMERCIAL

## 2025-03-25 PROBLEM — M17.12 PRIMARY OSTEOARTHRITIS OF LEFT KNEE: Status: ACTIVE | Noted: 2025-03-25

## 2025-03-25 PROBLEM — Z96.652 S/P TOTAL KNEE ARTHROPLASTY, LEFT: Status: ACTIVE | Noted: 2025-03-25

## 2025-05-21 PROBLEM — M24.662 ARTHROFIBROSIS OF KNEE JOINT, LEFT: Status: ACTIVE | Noted: 2025-05-21

## 2025-05-21 PROBLEM — M24.562 FLEXION CONTRACTURE OF LEFT KNEE: Status: ACTIVE | Noted: 2025-05-21

## 2025-05-22 ENCOUNTER — TELEPHONE (OUTPATIENT)
Dept: CARDIOLOGY | Facility: MEDICAL CENTER | Age: 65
End: 2025-05-22

## 2025-05-22 NOTE — TELEPHONE ENCOUNTER
She can proceed with the proposed procedure or surgery from a cardiac standpoint, no modifiable cardiovascular risk, no further cardiac testing required, hold antiplatelet as necessary, resume as soon as possible typically when patient is able to take oral medications.    It is my pleasure to participate in the care of Ms. Gunter.  Please do not hesitate to contact me with questions or concerns. [facility] Cardiology is available 24/7 for consultative services at [phone] in the perioperative period.    Electronically Signed    Jayjay Hernandez MD PhD FACC  Cardiologist [facility]

## 2025-05-22 NOTE — LETTER
PROCEDURE/SURGERY CLEARANCE FORM      Encounter Date: 5/22/2025    Patient: Ashley Gunter  YOB: 1960    CARDIOLOGIST:  Jayjay Hernandez MD PhD [facility]    REFERRING DOCTOR:  No ref. provider found      The above patient is cleared to have the following procedure/surgery:  Left Knee Arthroscopy Manipulation Under Anesthesia                                           Additional comments:She can proceed with the proposed procedure or surgery from a cardiac standpoint, no modifiable cardiovascular risk, no further cardiac testing required, hold antiplatelet as necessary, resume as soon as possible typically when patient is able to take oral medications.     It is my pleasure to participate in the care of Ms. Gunter.  Please do not hesitate to contact me with questions or concerns. [facility] Cardiology is available 24/7 for consultative services at [phone] in the perioperative period.     Electronically Signed     Jayjay Hernandez MD PhD [facility]  Cardiologist [facility]   PROCEDURE/SURGERY CLEARANCE FORM    Date: 5/23/2025   Patient Name: Ashley Gunter    Dear Surgeon or Proceduralist,      Thank you for your request for cardiac stratification of our mutual patient Ashley Gunter 1960. We have reviewed their [facility] records; and to the best of our understanding this patient has not had stenting, ablation, watchman, cardiothoracic surgery or hospitalization for cardiovascular reasons in the past 6 months.  Ashley Gunter has been seen within the past 15 months and is considered to have non-modifiable cardiac risk for this low-risk procedure/surgery. They may proceed from a cardiovascular standpoint and may hold their antiplatelet/anticoagulation as briefly as possible. Please have patient resume this medication when hemodynamically stable to do so.     Aspirin or Prasugrel   - hold 7 days prior to procedure/surgery, resume when hemodynamically stable      Clopidrogrel or  Ticagrelor  - hold 7 days for all neurological procedures, hold 5 days prior to all other procedure/surgery,  resume when hemodynamically stable     Warfarin - hold 7 days for all neurological procedures, hold 5 days prior to all other procedure/surgery and coordinate with [facility] ([phone]) INR testing and dose management.      Pradaxa/Xarelto/Eliquis/Savesya - hold 1 day prior to procedure for low bleeding risk procedure, 2 days for high bleeding risk procedure, or consider holding 3 days or longer for patients with reduced kidney function (CrCl <30mL/min) or spinal/cranial surgeries/procedures.      If they have a mechanical heart valve, please coordinate with [facility] ([phone]) the proper management of their anticoagulant in the periprocedural or perioperative period.      Some patients have higher risk for cardiovascular complications or holding medication. If our patient has had prior complications of holding antiplatelet or anticoagulants in the past and we have seen them after these events, we have addressed these concerns with the patient. They are at an unknown degree of increased risk for recurrent complication.  You may hold anticoagulation/antiplatelets for the procedure or surgery if the benefits of the procedure or surgery outweigh this nonmodifiable risk.      If Ashley Gunter 1960 has new symptoms of heart failure decompensation, unstable arrythmia, or angina please reach out and we will assess the patient.      If you have other patient-specific concerns, please feel free to reach out to the patient's cardiologist directly at [phone].     Thank you,       [facility]                 Electronically Signed      Jayjay Hernandez MD PhD FACC

## 2025-05-22 NOTE — TELEPHONE ENCOUNTER
----- Message from Nurse Arielle ERNANDEZ R.N. sent at 5/22/2025  9:39 AM PDT -----  Regarding: Plavix hold and upcoming surgery  Hello,Patient is scheduled for Left Knee Arthroscopy Manipulation Under Anesthesia, Lysis of Adhesions with Dr. Parr on 5/27/25. Is patient OK to hold Plavix 75 mg PO daily 5 days prior to surgery? Thanks,[facility] -[phone]  ----- Message -----  From: Arielle Hogan R.N.  Sent: 5/22/2025   9:41 AM PDT  To: Jayjay Hernandez M.D.  Subject: Plavix hold and upcoming surgery                 Hello,Patient is scheduled for Left Knee Arthroscopy Manipulation Under Anesthesia, Lysis of Adhesions with Dr. Parr on 5/27/25. Is patient OK to hold Plavix 75 mg PO daily 5 days prior to surgery? Thanks,[facility] -[phone]

## 2025-05-22 NOTE — TELEPHONE ENCOUNTER
----- Message from Nurse Lupe NUÑEZ R.N. sent at 5/22/2025 10:05 AM PDT -----  Regarding: FW: Plavix hold and upcoming surgery  Patient of [facility]  ----- Message -----  From: Arielle Hogan R.N.  Sent: 5/22/2025  10:03 AM PDT  To: Lupe Maradiaga R.N.; Jayjay Peterson  Subject: Plavix hold and upcoming surgery                 Hello,Patient is scheduled for Left Knee Arthroscopy Manipulation Under Anesthesia, Lysis of Adhesions with Dr. Parr on 5/27/25. Is patient OK to hold Plavix 75 mg PO daily 5 days prior to surgery? Thanks,[facility] -[phone]  ----- Message -----  From: Arielle Hogan R.N.  Sent: 5/22/2025   9:41 AM PDT  To: Jayjay Hernandez M.D.  Subject: Plavix hold and upcoming surgery                 Hello,Patient is scheduled for Left Knee Arthroscopy Manipulation Under Anesthesia, Lysis of Adhesions with Dr. Parr on 5/27/25. Is patient OK to hold Plavix 75 mg PO daily 5 days prior to surgery? Thanks,[facility] -[phone]

## 2025-05-23 NOTE — TELEPHONE ENCOUNTER
Last OV: 01.30.2025  Proposed Surgery: Left Knee Arthroscopy Manipulation Under Anesthesia  Surgery Date: 05.27.25  Requesting Office Name: Dr Parr  Fax Number: [phone]  Preference of Location (default is surgery center unless specified by Cardiologist or [facility])  Prior Clearance Addressed: No    Is this a general clearance? YES   Anticoags/Antiplatelets: Clopidogrel   Anticoags/Antiplatelet managed by Cardiology? YES    Outstanding Cardiac Imaging : No  Ablation, Cardioversion, Stent, Cardiac Devices, Catheterization, Watchman: No  TAVR/Valve, Mitral Clip, Watchman (including open heart),: N/A   Recent Cardiac Hospitalization: No            When: N/A  History (cardiac history): Past Medical History[1]        Is this a dental clearance? NO  Ablation, Cardioversion, Watchman, Stents, Cath, Devices within the last 3 months? No   If yes- Send dental wait letter, do not forward to provider for review.     TAVR / Valve, Mitral clip within the last 6 months? No  If yes- Send dental wait letter, do not forward to provider for review.     If completing a general clearance, continue per protocol.           Surgical Clearance Letter Sent: YES   **Scan clearance request letter into [employer].**            [1]   Past Medical History:  Diagnosis Date    Acute pain     Arthritis     At risk for sleep apnea     High cholesterol     Hypertension     Myocardial infarct (HCC) 10/2021    NSTEMI and stent -  GENEVIEVE to RCA 10/2021, on medical therapy including clopidogrel    TGA (transient global amnesia) 02/2025    3 episodes, most recent was last winter, 2024    Transient global amnesia     as of 5/22/25 -H/o transient global amnesia (TGA) x3, last over 1 year ago  Recent imaging work up negative: MRI, CTA head & neck

## 2025-08-14 ENCOUNTER — HOSPITAL ENCOUNTER (OUTPATIENT)
Dept: RADIOLOGY | Facility: MEDICAL CENTER | Age: 65
End: 2025-08-14
Attending: PSYCHIATRY & NEUROLOGY
Payer: COMMERCIAL

## 2025-08-14 DIAGNOSIS — I67.1 CEREBRAL ANEURYSM, NONRUPTURED: ICD-10-CM

## 2025-08-14 DIAGNOSIS — G45.4 TRANSIENT GLOBAL AMNESIA: ICD-10-CM

## 2025-08-14 DIAGNOSIS — R41.3 MEMORY DEFICIT: ICD-10-CM

## 2025-08-14 DIAGNOSIS — R41.89 OTHER SYMPTOMS AND SIGNS INVOLVING COGNITIVE FUNCTIONS AND AWARENESS: ICD-10-CM

## 2025-08-14 ASSESSMENT — ENCOUNTER SYMPTOMS
FEVER: 0
FOCAL WEAKNESS: 0
WEAKNESS: 0
CHILLS: 0
DIAPHORESIS: 0
BRUISES/BLEEDS EASILY: 1
WEIGHT LOSS: 0
SPEECH CHANGE: 0
MEMORY LOSS: 1
SENSORY CHANGE: 0
BLOOD IN STOOL: 0
CONSTITUTIONAL NEGATIVE: 1
HEMOPTYSIS: 0

## 2025-08-14 ASSESSMENT — LIFESTYLE VARIABLES: SUBSTANCE_ABUSE: 0
